# Patient Record
Sex: FEMALE | Race: WHITE | NOT HISPANIC OR LATINO | Employment: OTHER | URBAN - METROPOLITAN AREA
[De-identification: names, ages, dates, MRNs, and addresses within clinical notes are randomized per-mention and may not be internally consistent; named-entity substitution may affect disease eponyms.]

---

## 2017-04-10 ENCOUNTER — OFFICE VISIT (OUTPATIENT)
Dept: URGENT CARE | Facility: CLINIC | Age: 67
End: 2017-04-10
Payer: MEDICARE

## 2017-04-10 VITALS
RESPIRATION RATE: 20 BRPM | HEART RATE: 88 BPM | TEMPERATURE: 98.1 F | SYSTOLIC BLOOD PRESSURE: 128 MMHG | DIASTOLIC BLOOD PRESSURE: 80 MMHG | OXYGEN SATURATION: 97 %

## 2017-04-10 DIAGNOSIS — J06.9 UPPER RESPIRATORY INFECTION WITH COUGH AND CONGESTION: ICD-10-CM

## 2017-04-10 DIAGNOSIS — H65.01 RIGHT ACUTE SEROUS OTITIS MEDIA, RECURRENCE NOT SPECIFIED: ICD-10-CM

## 2017-04-10 PROCEDURE — G8419 CALC BMI OUT NRM PARAM NOF/U: HCPCS | Performed by: NURSE PRACTITIONER

## 2017-04-10 PROCEDURE — 1036F TOBACCO NON-USER: CPT | Performed by: NURSE PRACTITIONER

## 2017-04-10 PROCEDURE — 3014F SCREEN MAMMO DOC REV: CPT | Mod: 8P | Performed by: NURSE PRACTITIONER

## 2017-04-10 PROCEDURE — G8432 DEP SCR NOT DOC, RNG: HCPCS | Performed by: NURSE PRACTITIONER

## 2017-04-10 PROCEDURE — 1101F PT FALLS ASSESS-DOCD LE1/YR: CPT | Mod: 8P | Performed by: NURSE PRACTITIONER

## 2017-04-10 PROCEDURE — 4040F PNEUMOC VAC/ADMIN/RCVD: CPT | Mod: 8P | Performed by: NURSE PRACTITIONER

## 2017-04-10 PROCEDURE — 3017F COLORECTAL CA SCREEN DOC REV: CPT | Mod: 8P | Performed by: NURSE PRACTITIONER

## 2017-04-10 PROCEDURE — 99214 OFFICE O/P EST MOD 30 MIN: CPT | Performed by: NURSE PRACTITIONER

## 2017-04-10 RX ORDER — FLUTICASONE PROPIONATE 50 MCG
1 SPRAY, SUSPENSION (ML) NASAL 2 TIMES DAILY
Qty: 16 G | Refills: 0 | Status: SHIPPED | OUTPATIENT
Start: 2017-04-10 | End: 2023-10-13

## 2017-04-10 RX ORDER — AZITHROMYCIN 250 MG/1
TABLET, FILM COATED ORAL
Qty: 6 TAB | Refills: 0 | Status: SHIPPED | OUTPATIENT
Start: 2017-04-10 | End: 2023-10-13

## 2017-04-10 RX ORDER — CODEINE PHOSPHATE AND GUAIFENESIN 10; 100 MG/5ML; MG/5ML
5 SOLUTION ORAL
Qty: 120 ML | Refills: 0 | Status: SHIPPED | OUTPATIENT
Start: 2017-04-10 | End: 2023-10-13

## 2017-04-10 RX ORDER — TOPIRAMATE 50 MG/1
50 TABLET, FILM COATED ORAL 2 TIMES DAILY
COMMUNITY
End: 2023-10-13

## 2017-04-10 RX ORDER — BENZONATATE 100 MG/1
100 CAPSULE ORAL 3 TIMES DAILY PRN
Qty: 60 CAP | Refills: 0 | Status: SHIPPED | OUTPATIENT
Start: 2017-04-10 | End: 2023-10-13

## 2017-04-10 NOTE — PROGRESS NOTES
Chief Complaint   Patient presents with   • Cough     Almost a week wet cough and chest congestion        HISTORY OF PRESENT ILLNESS: Patient is a 67 y.o. female who presents today due to symptoms that started seven days ago. Pt reports a productive cough without blood in sputum. Reports associated mild sore throat, nasal congestion, right ear pain, fever, and body aches. Denies chest pain, shortness of breath, or wheezing. Admits to h/o asthma, CAP in 1980s. Has not needed her inhaler more often since onset of symptoms. No immunocompromise. Has tried OTC cold medications without significant relief of symptoms. No recent ABX use. Her  is here today is a patient with similar symptoms. No other aggravating or alleviating factors.     Patient Active Problem List    Diagnosis Date Noted   • Overweight 09/04/2015   • Hypertrophy of breast 08/28/2012   • Cervicalgia 08/28/2012   • Backache 08/28/2012       Allergies:Iodine; Penicillins; and Tape    Current Outpatient Prescriptions Ordered in [facility]   Medication Sig Dispense Refill   • topiramate (TOPAMAX) 50 MG tablet Take 50 mg by mouth 2 times a day.     • albuterol 108 (90 BASE) MCG/ACT Aero Soln inhalation aerosol Inhale 2 Puffs by mouth every 6 hours as needed for Shortness of Breath. 8.5 g 3   • Cholecalciferol (VITAMIN D3) 3000 UNITS Tab Take 10,000 Units by mouth every 7 days.     • fluoxetine (PROZAC) 20 MG Cap      • Testosterone 1.25 GM/ACT (1%) GEL Apply  to skin as directed.     • PROGESTERONE, VAGINAL, 4 % GEL Insert  in vagina. Applied to skin not vaginally     • lorazepam (ATIVAN) 0.5 MG TABS Take 0.5 mg by mouth every four hours as needed.     • Emollient (DHEA) 1 % CREA by Apply externally route.       No current [facility]-ordered facility-administered medications on file.       Past Medical History   Diagnosis Date   • Panic attacks    • Hiatus hernia syndrome    • Bronchitis    • ASTHMA      rare inhaler use   • Psychiatric problem      anxiety panic  "attacks   • Unspecified urinary incontinence      wears pads   • Anesthesia      \"Hard to get breath when first waking up\"   • Arrhythmia      can feel a \"flutter but has been checked out and is fine\"       Social History   Substance Use Topics   • Smoking status: Former Smoker -- 1.00 packs/day for 1.5 years     Quit date: 01/02/1970   • Smokeless tobacco: Never Used   • Alcohol Use: No       No family status information on file.     Family History   Problem Relation Age of Onset   • Hypertension Mother        ROS:  Review of Systems   Constitutional: Positive for subjective fever, chills, fatigue. Negative for weight loss and malaise.  HENT: Positive for congestion, right ear pain, and sore throat. Negative for nosebleeds, and neck pain.    Eyes: Negative for vision changes.   Cardiovascular: Negative for chest pain, palpitations, orthopnea and leg swelling.   Respiratory: Positive for cough and sputum production. Negative for shortness of breath and wheezing.   Gastrointestinal: Negative for abdominal pain, nausea, vomiting or diarrhea.   Skin: Negative for rash, diaphoresis.     Exam:  Blood pressure 128/80, pulse 88, temperature 36.7 °C (98.1 °F), resp. rate 20, SpO2 97 %.  General: well-nourished, well-developed female in NAD  Head: normocephalic, atraumatic  Eyes: PERRLA, EOM within normal limits, no conjunctival injection, no scleral icterus, visual fields and acuity grossly intact.  Ears: normal shape and symmetry, no tenderness, no discharge. External canals are without any significant edema or erythema. Right tympanic membrane is erythematous, injected, bulging. Left tympanic membrane is without any inflammation, no effusion. Gross auditory acuity is intact.  Nose: symmetrical without tenderness, mild discharge, erythema present bilateral nares.  Mouth/Throat: reasonable hygiene, no exudates or tonsillar enlargement. Erythema present.   Neck: no masses, range of motion within normal limits, no tracheal " deviation.  Lymph: mild cervical adenopathy. No supraclavicular adenopathy.   Neuro: alert and oriented. Cranial nerves 1-12 grossly intact.   Cardiovascular: regular rate and rhythm without murmurs, rubs, or gallops. No edema.   Pulmonary: no distress. Chest is symmetrical with respiration, no wheezes, crackles, or rhonchi.   Musculoskeletal: appropriate muscle tone, gait is stable.  Skin: warm, dry, intact, no clubbing, no cyanosis.   Psych: appropriate mood, affect, judgement.         Assessment/Plan:  1. Upper respiratory infection with cough and congestion  azithromycin (ZITHROMAX) 250 MG Tab    fluticasone (FLONASE) 50 MCG/ACT nasal spray    guaifenesin-codeine (ROBITUSSIN AC) Solution oral solution    benzonatate (TESSALON) 100 MG Cap   2. Right acute serous otitis media, recurrence not specified  fluticasone (FLONASE) 50 MCG/ACT nasal spray           Azithromycin and Flonase as directed. Tessalon Perles and Robitussin-AC for cough, sedative effects of medications discussed.  Rest, increase fluids, hand and respiratory hygiene. May take OTC medications as directed for symptom relief. Honey for cough.   Supportive care, differential diagnoses, and indications for immediate follow-up discussed with patient.   Pathogenesis of diagnosis discussed including typical length and natural progression.  Instructed to return to clinic or nearest emergency department for any change in condition, further concerns, or worsening of symptoms.  Patient states understanding of the plan of care and discharge instructions.  Instructed to make an appointment with their primary care provider in the next 3-7 days if not significantly improving and for further care.         Please note that this dictation was created using voice recognition software. I have made every reasonable attempt to correct obvious errors, but I expect that there are errors of grammar and possibly content that I did not discover before finalizing the  note.      ILSA Reagan.

## 2017-04-13 ENCOUNTER — APPOINTMENT (OUTPATIENT)
Dept: RADIOLOGY | Facility: IMAGING CENTER | Age: 67
End: 2017-04-13
Attending: PHYSICIAN ASSISTANT
Payer: MEDICARE

## 2017-04-13 ENCOUNTER — OFFICE VISIT (OUTPATIENT)
Dept: URGENT CARE | Facility: CLINIC | Age: 67
End: 2017-04-13
Payer: MEDICARE

## 2017-04-13 VITALS
WEIGHT: 160 LBS | BODY MASS INDEX: 25.71 KG/M2 | OXYGEN SATURATION: 95 % | RESPIRATION RATE: 17 BRPM | SYSTOLIC BLOOD PRESSURE: 102 MMHG | TEMPERATURE: 97.8 F | HEIGHT: 66 IN | DIASTOLIC BLOOD PRESSURE: 80 MMHG | HEART RATE: 82 BPM

## 2017-04-13 DIAGNOSIS — R05.9 COUGH: ICD-10-CM

## 2017-04-13 PROCEDURE — 4040F PNEUMOC VAC/ADMIN/RCVD: CPT | Mod: 8P | Performed by: PHYSICIAN ASSISTANT

## 2017-04-13 PROCEDURE — G8419 CALC BMI OUT NRM PARAM NOF/U: HCPCS | Performed by: PHYSICIAN ASSISTANT

## 2017-04-13 PROCEDURE — 71020 DX-CHEST-2 VIEWS: CPT | Mod: TC | Performed by: PHYSICIAN ASSISTANT

## 2017-04-13 PROCEDURE — 3017F COLORECTAL CA SCREEN DOC REV: CPT | Mod: 8P | Performed by: PHYSICIAN ASSISTANT

## 2017-04-13 PROCEDURE — 99214 OFFICE O/P EST MOD 30 MIN: CPT | Performed by: PHYSICIAN ASSISTANT

## 2017-04-13 PROCEDURE — 1101F PT FALLS ASSESS-DOCD LE1/YR: CPT | Mod: 8P | Performed by: PHYSICIAN ASSISTANT

## 2017-04-13 PROCEDURE — 3014F SCREEN MAMMO DOC REV: CPT | Mod: 8P | Performed by: PHYSICIAN ASSISTANT

## 2017-04-13 PROCEDURE — 1036F TOBACCO NON-USER: CPT | Performed by: PHYSICIAN ASSISTANT

## 2017-04-13 PROCEDURE — G8432 DEP SCR NOT DOC, RNG: HCPCS | Performed by: PHYSICIAN ASSISTANT

## 2017-04-13 RX ORDER — METHYLPREDNISOLONE 4 MG/1
TABLET ORAL
Qty: 21 TAB | Refills: 0 | Status: SHIPPED | OUTPATIENT
Start: 2017-04-13 | End: 2023-10-13

## 2017-04-13 NOTE — MR AVS SNAPSHOT
"        Niya Stone   2017 1:25 PM   Office Visit   MRN: [id number]    Department:  [facility]   Dept Phone:  [phone]    Description:  Female : 1950   Provider:  Anel Roach PA-C           Reason for Visit     URI seen previous, not feeling well, congestion, cough, not feeling well, hx of asthma, very tired, feeling weak, ears plugged      Allergies as of 2017     Allergen Noted Reactions    Iodine 2012   Swelling    Contrast      Penicillins 2012   Rash    Whole trunk area    Tape 2012   Rash    Steri strips - open sores  -  Paper tape & plastic tape OK      You were diagnosed with     Cough   [786.2.ICD-9-CM]         Vital Signs     Blood Pressure Pulse Temperature Respirations Height Weight    102/80 mmHg 82 36.6 °C (97.8 °F) 17 1.676 m (5' 5.98\") 72.576 kg (160 lb)    Body Mass Index Oxygen Saturation Smoking Status             25.84 kg/m2 95% Former Smoker         Basic Information     Date Of Birth Sex Race Ethnicity Preferred Language    1950 Female White Non- English      Problem List              ICD-10-CM Priority Class Noted - Resolved    Hypertrophy of breast N62   2012 - Present    Cervicalgia M54.2   2012 - Present    Backache M54.9   2012 - Present    Overweight E66.3   2015 - Present      Health Maintenance        Date Due Completion Dates    IMM DTaP/Tdap/Td Vaccine (1 - Tdap) 1968 ---    PAP SMEAR 1970 ---    COLONOSCOPY 1999 ---    MAMMOGRAM 10/10/2007 10/10/2006, 2005    IMM ZOSTER VACCINE 2009 ---    IMM PNEUMOCOCCAL 65+ (ADULT) LOW/MEDIUM RISK SERIES (1 of 2 - PCV13) 2014 ---    BONE DENSITY 2019            Current Immunizations     No immunizations on file.      Below and/or attached are the medications your provider expects you to take. Review all of your home medications and newly ordered medications with your provider and/or pharmacist. Follow medication " instructions as directed by your provider and/or pharmacist. Please keep your medication list with you and share with your provider. Update the information when medications are discontinued, doses are changed, or new medications (including over-the-counter products) are added; and carry medication information at all times in the event of emergency situations     Allergies:  IODINE - Swelling     PENICILLINS - Rash     TAPE - Rash               Medications  Valid as of: April 13, 2017 -  2:06 PM    Generic Name Brand Name Tablet Size Instructions for use    Albuterol Sulfate (Aero Soln) albuterol 108 (90 BASE) MCG/ACT Inhale 2 Puffs by mouth every 6 hours as needed for Shortness of Breath.        Azithromycin (Tab) ZITHROMAX 250 MG Take two tabs on day one followed by one tab on days 2-5.        Benzonatate (Cap) TESSALON 100 MG Take 1 Cap by mouth 3 times a day as needed for Cough.        Cholecalciferol (Tab) Vitamin D3 3000 UNITS Take 10,000 Units by mouth every 7 days.        Emollient (Cream) DHEA 1 % by Apply externally route.        FLUoxetine HCl (Cap) PROZAC 20 MG         Fluticasone Propionate (Suspension) FLONASE 50 MCG/ACT Spray 1 Spray in nose 2 times a day.        Guaifenesin-Codeine (Solution) ROBITUSSIN -10 mg/5mL Take 5 mL by mouth at bedtime as needed.        LORazepam (Tab) ATIVAN 0.5 MG Take 0.5 mg by mouth every four hours as needed.        Progesterone (Gel) PROGESTERONE (VAGINAL) 4 % Insert  in vagina. Applied to skin not vaginally        Testosterone (Gel) Testosterone 1.25 GM/ACT (1%) Apply  to skin as directed.        Topiramate (Tab) TOPAMAX 50 MG Take 50 mg by mouth 2 times a day.        .                 Medicines prescribed today were sent to:     [facility] [address] [place] [address], [place] - [address] [facility]    [address] [facility] [address]    Phone: [phone] Fax: [phone]    Open 24 Hours?: No      Medication refill  instructions:       If your prescription bottle indicates you have medication refills left, it is not necessary to call your provider’s office. Please contact your pharmacy and they will refill your medication.    If your prescription bottle indicates you do not have any refills left, you may request refills at any time through one of the following ways: The online Krillion system (except Urgent Care), by calling your provider’s office, or by asking your pharmacy to contact your provider’s office with a refill request. Medication refills are processed only during regular business hours and may not be available until the next business day. Your provider may request additional information or to have a follow-up visit with you prior to refilling your medication.   *Please Note: Medication refills are assigned a new Rx number when refilled electronically. Your pharmacy may indicate that no refills were authorized even though a new prescription for the same medication is available at the pharmacy. Please request the medicine by name with the pharmacy before contacting your provider for a refill.        Your To Do List     Future Labs/Procedures Complete By Expires    DX-CHEST-2 VIEWS  As directed 4/13/2018         Krillion Access Code: [email or web address]  Expires: 5/10/2017 11:20 AM    Krillion  A secure, online tool to manage your health information     [employer]’s Krillion® is a secure, online tool that connects you to your personalized health information from the privacy of your home -- day or night - making it very easy for you to manage your healthcare. Once the activation process is completed, you can even access your medical information using the Krillion cliff, which is available for free in the Apple Cliff store or Google Play store.     Krillion provides the following levels of access (as shown below):   My Chart Features   [facility] Doctor [facility]  Specialists [facility]  Urgent  Care Non-[facility]  Primary  Care  Doctor   Email your healthcare team securely and privately 24/7 X X X    Manage appointments: schedule your next appointment; view details of past/upcoming appointments X      Request prescription refills. X      View recent personal medical records, including lab and immunizations X X X X   View health record, including health history, allergies, medications X X X X   Read reports about your outpatient visits, procedures, consult and ER notes X X X X   See your discharge summary, which is a recap of your hospital and/or ER visit that includes your diagnosis, lab results, and care plan. X X       How to register for [employer]:  1. Go to  [email or web address].  2. Click on the Sign Up Now box, which takes you to the New Member Sign Up page. You will need to provide the following information:  a. Enter your [employer] Access Code exactly as it appears at the top of this page. (You will not need to use this code after you’ve completed the sign-up process. If you do not sign up before the expiration date, you must request a new code.)   b. Enter your date of birth.   c. Enter your home email address.   d. Click Submit, and follow the next screen’s instructions.  3. Create a [employer] ID. This will be your [employer] login ID and cannot be changed, so think of one that is secure and easy to remember.  4. Create a [employer] password. You can change your password at any time.  5. Enter your Password Reset Question and Answer. This can be used at a later time if you forget your password.   6. Enter your e-mail address. This allows you to receive e-mail notifications when new information is available in [employer].  7. Click Sign Up. You can now view your health information.    For assistance activating your [employer] account, call [phone]

## 2017-04-13 NOTE — PROGRESS NOTES
Chief Complaint   Patient presents with   • URI     seen previous, not feeling well, congestion, cough, not feeling well, hx of asthma, very tired, feeling weak, ears plugged       HISTORY OF PRESENT ILLNESS: Patient is a 67 y.o. female who presents today for 1-2 weeks of cough and congestion.   Patient was seen here 04/10/17 and given Z-pack and cough medicine.  She states she feels better overall but is still having cough and sinus pressure and feels exhausted.    She states she gets up in the morning and feels fine but by the middle of the day she is tired.  She has not felt wheezing or SOB in particular. No chest discomfort.  No leg swelling  She admits she still is having quite a bit of coughing and sleeping poorly overall.   Hx of asthma, generally excellently controlled.     Patient Active Problem List    Diagnosis Date Noted   • Overweight 09/04/2015   • Hypertrophy of breast 08/28/2012   • Cervicalgia 08/28/2012   • Backache 08/28/2012       Allergies:Iodine; Penicillins; and Tape    Current Outpatient Prescriptions Ordered in [facility]   Medication Sig Dispense Refill   • MethylPREDNISolone (MEDROL DOSEPAK) 4 MG Tablet Therapy Pack Take as directed 21 Tab 0   • topiramate (TOPAMAX) 50 MG tablet Take 50 mg by mouth 2 times a day.     • Cholecalciferol (VITAMIN D3) 3000 UNITS Tab Take 10,000 Units by mouth every 7 days.     • fluoxetine (PROZAC) 20 MG Cap      • Testosterone 1.25 GM/ACT (1%) GEL Apply  to skin as directed.     • PROGESTERONE, VAGINAL, 4 % GEL Insert  in vagina. Applied to skin not vaginally     • Emollient (DHEA) 1 % CREA by Apply externally route.     • lorazepam (ATIVAN) 0.5 MG TABS Take 0.5 mg by mouth every four hours as needed.     • azithromycin (ZITHROMAX) 250 MG Tab Take two tabs on day one followed by one tab on days 2-5. 6 Tab 0   • fluticasone (FLONASE) 50 MCG/ACT nasal spray Spray 1 Spray in nose 2 times a day. 16 g 0   • guaifenesin-codeine (ROBITUSSIN AC) Solution oral solution Take  "5 mL by mouth at bedtime as needed. 120 mL 0   • benzonatate (TESSALON) 100 MG Cap Take 1 Cap by mouth 3 times a day as needed for Cough. 60 Cap 0   • albuterol 108 (90 BASE) MCG/ACT Aero Soln inhalation aerosol Inhale 2 Puffs by mouth every 6 hours as needed for Shortness of Breath. 8.5 g 3     No current [facility]-ordered facility-administered medications on file.       Past Medical History   Diagnosis Date   • Panic attacks    • Hiatus hernia syndrome    • Bronchitis    • ASTHMA      rare inhaler use   • Psychiatric problem      anxiety panic attacks   • Unspecified urinary incontinence      wears pads   • Anesthesia      \"Hard to get breath when first waking up\"   • Arrhythmia      can feel a \"flutter but has been checked out and is fine\"       Social History   Substance Use Topics   • Smoking status: Former Smoker -- 1.00 packs/day for 1.5 years     Quit date: 01/02/1970   • Smokeless tobacco: Never Used   • Alcohol Use: No       No family status information on file.     Family History   Problem Relation Age of Onset   • Hypertension Mother        ROS:  Review of Systems   Constitutional: SEE HPI  HENT:  SEE HPI  Eyes: Negative for blurred vision.   Respiratory:  SEE HPI  Cardiovascular: Negative for chest pain, palpitations, orthopnea and leg swelling.   Gastrointestinal: Negative for heartburn, nausea, vomiting and abdominal pain.   All other systems reviewed and are negative.     Exam:  Blood pressure 102/80, pulse 82, temperature 36.6 °C (97.8 °F), resp. rate 17, height 1.676 m (5' 5.98\"), weight 72.576 kg (160 lb), SpO2 95 %.  General:  Well nourished, well developed female in NAD  Eyes: PERRLA, EOM within normal limits, no conjunctival injection, no scleral icterus, visual fields and acuity grossly intact.  Ears: Normal shape and symmetry, no tenderness, no discharge. External canals are without any significant edema or erythema. Tympanic membranes are without any inflammation, no effusion. Gross auditory " acuity is intact  Nose: Symmetrical, sinuses without tenderness, clear rhinorrhea.    Mouth: reasonable hygiene, no erythema exudates or tonsillar enlargement.  Neck: no masses, range of motion within normal limits, no tracheal deviation. No lymphadenopathy  Pulmonary: Normal respiratory effort, no wheezes, crackles, or rhonchi.  Cardiovascular: regular rate and rhythm without murmurs, rubs, or gallops.  Abdomen: Soft, nontender, nondistended. Normal bowel sounds. No hepatosplenomegaly or masses, or hernias. No rebound or guarding.  Skin: No visible rashes or lesion. Warm, pink, dry.   Extremities: no clubbing, cyanosis, or edema or leg swelling.   Neuro: A&O x 3. Speech normal/clear.  Normal gait.     RAD    Impression        No acute findings.         Reading Provider Reading Date     Leona Smith M.D. Apr 13, 2017       Assessment/Plan:  1. Cough  DX-CHEST-2 VIEWS    MethylPREDNISolone (MEDROL DOSEPAK) 4 MG Tablet Therapy Pack       -RAD negative as above.   Initial concern for pneumonia, patient reported hx.    -discussed working on adding anti-histamine and continuing Flonase, lung care.   -discussed steroids for sinus inflammation/cough in setting of hx of asthma, patient elects this trial.   -she has PCP appt in 2 weeks.  Please keep this for follow up  -RTC precautions in meantime if worsening at all    Supportive care, differential diagnoses, and indications for immediate follow-up discussed with patient.   Pathogenesis of diagnosis discussed including typical length and natural progression.   Instructed to return to clinic or nearest emergency department for any change in condition, further concerns, or worsening of symptoms.  Patient states understanding of the plan of care and discharge instructions.      Anel Roach PA-C

## 2017-06-22 ENCOUNTER — RX ONLY (OUTPATIENT)
Age: 67
Setting detail: RX ONLY
End: 2017-06-22

## 2017-08-14 ENCOUNTER — RX ONLY (OUTPATIENT)
Age: 67
Setting detail: RX ONLY
End: 2017-08-14

## 2021-01-13 ENCOUNTER — HOSPITAL ENCOUNTER (OUTPATIENT)
Facility: MEDICAL CENTER | Age: 71
End: 2021-01-13
Attending: UROLOGY
Payer: MEDICARE

## 2021-01-13 PROCEDURE — 87086 URINE CULTURE/COLONY COUNT: CPT

## 2021-01-16 LAB
BACTERIA UR CULT: NORMAL
SIGNIFICANT IND 70042: NORMAL
SITE SITE: NORMAL
SOURCE SOURCE: NORMAL

## 2023-10-12 ENCOUNTER — HOSPITAL ENCOUNTER (OUTPATIENT)
Dept: RADIOLOGY | Facility: MEDICAL CENTER | Age: 73
End: 2023-10-12
Attending: SURGERY
Payer: COMMERCIAL

## 2023-10-12 DIAGNOSIS — R19.04 LEFT LOWER QUADRANT ABDOMINAL MASS: ICD-10-CM

## 2023-10-12 PROCEDURE — 74176 CT ABD & PELVIS W/O CONTRAST: CPT

## 2023-10-13 ENCOUNTER — HOSPITAL ENCOUNTER (EMERGENCY)
Facility: MEDICAL CENTER | Age: 73
End: 2023-10-13
Attending: EMERGENCY MEDICINE
Payer: COMMERCIAL

## 2023-10-13 VITALS
OXYGEN SATURATION: 95 % | BODY MASS INDEX: 27.96 KG/M2 | HEART RATE: 69 BPM | TEMPERATURE: 98.4 F | SYSTOLIC BLOOD PRESSURE: 121 MMHG | HEIGHT: 64 IN | WEIGHT: 163.8 LBS | DIASTOLIC BLOOD PRESSURE: 57 MMHG | RESPIRATION RATE: 18 BRPM

## 2023-10-13 DIAGNOSIS — T85.79XA INFECTED PROSTHETIC MESH OF ABDOMINAL WALL, INITIAL ENCOUNTER (HCC): ICD-10-CM

## 2023-10-13 LAB
ALBUMIN SERPL BCP-MCNC: 4.1 G/DL (ref 3.2–4.9)
ALBUMIN/GLOB SERPL: 1 G/DL
ALP SERPL-CCNC: 130 U/L (ref 30–99)
ALT SERPL-CCNC: 14 U/L (ref 2–50)
ANION GAP SERPL CALC-SCNC: 12 MMOL/L (ref 7–16)
AST SERPL-CCNC: 15 U/L (ref 12–45)
BASOPHILS # BLD AUTO: 0.4 % (ref 0–1.8)
BASOPHILS # BLD: 0.03 K/UL (ref 0–0.12)
BILIRUB SERPL-MCNC: 0.4 MG/DL (ref 0.1–1.5)
BUN SERPL-MCNC: 8 MG/DL (ref 8–22)
CALCIUM ALBUM COR SERPL-MCNC: 9.1 MG/DL (ref 8.5–10.5)
CALCIUM SERPL-MCNC: 9.2 MG/DL (ref 8.4–10.2)
CHLORIDE SERPL-SCNC: 101 MMOL/L (ref 96–112)
CO2 SERPL-SCNC: 26 MMOL/L (ref 20–33)
CREAT SERPL-MCNC: 0.56 MG/DL (ref 0.5–1.4)
EOSINOPHIL # BLD AUTO: 0.11 K/UL (ref 0–0.51)
EOSINOPHIL NFR BLD: 1.4 % (ref 0–6.9)
ERYTHROCYTE [DISTWIDTH] IN BLOOD BY AUTOMATED COUNT: 43.7 FL (ref 35.9–50)
GFR SERPLBLD CREATININE-BSD FMLA CKD-EPI: 95 ML/MIN/1.73 M 2
GLOBULIN SER CALC-MCNC: 4.1 G/DL (ref 1.9–3.5)
GLUCOSE SERPL-MCNC: 94 MG/DL (ref 65–99)
HCT VFR BLD AUTO: 46.7 % (ref 37–47)
HGB BLD-MCNC: 15.1 G/DL (ref 12–16)
IMM GRANULOCYTES # BLD AUTO: 0.03 K/UL (ref 0–0.11)
IMM GRANULOCYTES NFR BLD AUTO: 0.4 % (ref 0–0.9)
LACTATE SERPL-SCNC: 0.8 MMOL/L (ref 0.5–2)
LYMPHOCYTES # BLD AUTO: 1.74 K/UL (ref 1–4.8)
LYMPHOCYTES NFR BLD: 22.8 % (ref 22–41)
MCH RBC QN AUTO: 31.4 PG (ref 27–33)
MCHC RBC AUTO-ENTMCNC: 32.3 G/DL (ref 32.2–35.5)
MCV RBC AUTO: 97.1 FL (ref 81.4–97.8)
MONOCYTES # BLD AUTO: 0.63 K/UL (ref 0–0.85)
MONOCYTES NFR BLD AUTO: 8.2 % (ref 0–13.4)
NEUTROPHILS # BLD AUTO: 5.1 K/UL (ref 1.82–7.42)
NEUTROPHILS NFR BLD: 66.8 % (ref 44–72)
NRBC # BLD AUTO: 0 K/UL
NRBC BLD-RTO: 0 /100 WBC (ref 0–0.2)
PLATELET # BLD AUTO: 347 K/UL (ref 164–446)
PMV BLD AUTO: 9.6 FL (ref 9–12.9)
POTASSIUM SERPL-SCNC: 4 MMOL/L (ref 3.6–5.5)
PROT SERPL-MCNC: 8.2 G/DL (ref 6–8.2)
RBC # BLD AUTO: 4.81 M/UL (ref 4.2–5.4)
SODIUM SERPL-SCNC: 139 MMOL/L (ref 135–145)
WBC # BLD AUTO: 7.6 K/UL (ref 4.8–10.8)

## 2023-10-13 PROCEDURE — 80053 COMPREHEN METABOLIC PANEL: CPT

## 2023-10-13 PROCEDURE — 99284 EMERGENCY DEPT VISIT MOD MDM: CPT

## 2023-10-13 PROCEDURE — 85025 COMPLETE CBC W/AUTO DIFF WBC: CPT

## 2023-10-13 PROCEDURE — 96374 THER/PROPH/DIAG INJ IV PUSH: CPT

## 2023-10-13 PROCEDURE — 700111 HCHG RX REV CODE 636 W/ 250 OVERRIDE (IP): Mod: JZ | Performed by: EMERGENCY MEDICINE

## 2023-10-13 PROCEDURE — 36415 COLL VENOUS BLD VENIPUNCTURE: CPT

## 2023-10-13 PROCEDURE — 83605 ASSAY OF LACTIC ACID: CPT

## 2023-10-13 PROCEDURE — 87040 BLOOD CULTURE FOR BACTERIA: CPT

## 2023-10-13 RX ORDER — LORAZEPAM 1 MG/1
1 TABLET ORAL EVERY 6 HOURS PRN
Qty: 3 TABLET | Refills: 0 | Status: SHIPPED | OUTPATIENT
Start: 2023-10-13 | End: 2023-10-13 | Stop reason: SDUPTHER

## 2023-10-13 RX ORDER — NAPROXEN SOD/DIPHENHYDRAMINE 220MG-25MG
1 TABLET ORAL
Status: SHIPPED | COMMUNITY
End: 2023-11-03

## 2023-10-13 RX ORDER — SULFAMETHOXAZOLE AND TRIMETHOPRIM 800; 160 MG/1; MG/1
1 TABLET ORAL 2 TIMES DAILY
Qty: 14 TABLET | Refills: 0 | Status: ACTIVE | OUTPATIENT
Start: 2023-10-13 | End: 2023-10-20

## 2023-10-13 RX ORDER — LORAZEPAM 2 MG/ML
0.5 INJECTION INTRAMUSCULAR ONCE
Status: COMPLETED | OUTPATIENT
Start: 2023-10-13 | End: 2023-10-13

## 2023-10-13 RX ORDER — LORAZEPAM 1 MG/1
1 TABLET ORAL EVERY 6 HOURS PRN
Qty: 3 TABLET | Refills: 0 | Status: SHIPPED | OUTPATIENT
Start: 2023-10-13 | End: 2023-10-16

## 2023-10-13 RX ORDER — DIAZEPAM 5 MG/1
5 TABLET ORAL EVERY 12 HOURS PRN
Status: SHIPPED | COMMUNITY
End: 2023-10-13

## 2023-10-13 RX ORDER — MONTELUKAST SODIUM 10 MG/1
10 TABLET ORAL
COMMUNITY

## 2023-10-13 RX ADMIN — LORAZEPAM 0.5 MG: 2 INJECTION INTRAMUSCULAR; INTRAVENOUS at 10:07

## 2023-10-13 ASSESSMENT — FIBROSIS 4 INDEX: FIB4 SCORE: 0.63

## 2023-10-13 NOTE — ED NOTES
"Poc reviewed with pt and spouse. Saline lock with blood draw after amb to br for void with collection of urine spec. Med for anxiety per request, aware of npo-states last po=\"sip of green tea\" pta  "

## 2023-10-13 NOTE — ED NOTES
Dc instructions and prescription reviewed with pt. Aware of need to  meds at [facility] in [place] and start today. Pt requesting ativan on d/c-update to erp. Pt also aware of need to f/u with surgery and pcp, to return for worsening s/s

## 2023-10-13 NOTE — ED NOTES
Pharmacy Medication Reconciliation      ~Medication reconciliation updated and complete per patient at bedside   ~Allergies have been verified and updated   ~No oral ABX within the last 30 days  ~Patient home pharmacy :  [facility]       ~Anticoagulants (rivaroxaban, apixaban, edoxaban, dabigatran, warfarin, enoxaparin) taken in the last 14 days? No

## 2023-10-13 NOTE — ED PROVIDER NOTES
"ER Provider Note    Scribed for Dr. Gordy Yuen M.D. by Roxi Loera. 10/13/2023  9:31 AM    Primary Care Provider: Dany Bautista M.D.    CHIEF COMPLAINT  Chief Complaint   Patient presents with    Abnormal Labs     Ct scan results with abscess       EXTERNAL RECORDS REVIEWED  Care everywhere: Ct scan shows mesh infection on the 12th ordered by Dr. Soto, surgery.    HPI/ROS  LIMITATION TO HISTORY   Select: : None    OUTSIDE HISTORIAN(S):      Niya Stone is a 73 y.o. female who presents to the ED for abdominal pain onset 2-3 weeks ago. She has a history of mesh placement by Dr. Soto, surgery 10 years ago. The patient states Dr. Soto ordered a Ct scan which showed possible mesh infection, prompting her to present to the ED today. She reports associated diaphoresis when she wakes up, but denies any fever or chills. No alleviating factors attempted. The patient states she has been having anxiety attacks and is having one currently. She adds \"they flare up when I have medical issues.\"    PAST MEDICAL HISTORY  Past Medical History:   Diagnosis Date    Anesthesia     \"Hard to get breath when first waking up\"    Arrhythmia     can feel a \"flutter but has been checked out and is fine\"    ASTHMA     rare inhaler use    Bronchitis     Hiatus hernia syndrome     Panic attacks     Psychiatric problem     anxiety panic attacks    Unspecified urinary incontinence     wears pads       SURGICAL HISTORY  Past Surgical History:   Procedure Laterality Date    GASTROSCOPY  9/4/2015    Procedure: GASTRIC BYPASS LAPAROSCOPY REVISION ENDOSCOPIC MINA EN Y, GASTRIC OULET REPAIR ;  Surgeon: Louie Wei M.D.;  Location: SURGERY [facility];  Service:     MAMMOPLASTY REDUCTION  8/28/2012    Performed by OMAYRA GOYAL at [facility]    LIPOSUCTION  8/28/2012    Performed by OMAYRA GOYAL at [facility]    HB COSMETIC ADJUSTMENT  2004    arm skin reduction, back skin " removal, s/p gastric bypass    GASTRIC BYPASS LAPAROSCOPIC      CHOLECYSTECTOMY      HYSTERECTOMY, TOTAL ABDOMINAL  1991    NM  DELIVERY ONLY      NERVE ULNAR REPAIR OR EXPLORE  1980    HERNIA REPAIR  [phone]    multiple       FAMILY HISTORY  Family History   Problem Relation Age of Onset    Hypertension Mother        SOCIAL HISTORY   reports that she quit smoking about 53 years ago. Her smoking use included cigarettes. She started smoking about 55 years ago. She has a 1.5 pack-year smoking history. She has never used smokeless tobacco. She reports that she does not drink alcohol and does not use drugs.    CURRENT MEDICATIONS  Previous Medications    ALBUTEROL 108 (90 BASE) MCG/ACT AERO SOLN INHALATION AEROSOL    Inhale 2 Puffs by mouth every 6 hours as needed for Shortness of Breath.    AZITHROMYCIN (ZITHROMAX) 250 MG TAB    Take two tabs on day one followed by one tab on days 2-5.    BENZONATATE (TESSALON) 100 MG CAP    Take 1 Cap by mouth 3 times a day as needed for Cough.    CHOLECALCIFEROL (VITAMIN D3) 3000 UNITS TAB    Take 10,000 Units by mouth every 7 days.    EMOLLIENT (DHEA) 1 % CREA    by Apply externally route.    FLUOXETINE (PROZAC) 20 MG CAP        FLUTICASONE (FLONASE) 50 MCG/ACT NASAL SPRAY    Spray 1 Spray in nose 2 times a day.    GUAIFENESIN-CODEINE (ROBITUSSIN AC) SOLUTION ORAL SOLUTION    Take 5 mL by mouth at bedtime as needed.    LORAZEPAM (ATIVAN) 0.5 MG TABS    Take 0.5 mg by mouth every four hours as needed.    METHYLPREDNISOLONE (MEDROL DOSEPAK) 4 MG TABLET THERAPY PACK    Take as directed    PROGESTERONE, VAGINAL, 4 % GEL    Insert  in vagina. Applied to skin not vaginally    TESTOSTERONE 1.25 GM/ACT (1%) GEL    Apply  to skin as directed.    TOPIRAMATE (TOPAMAX) 50 MG TABLET    Take 50 mg by mouth 2 times a day.       ALLERGIES  Iodine, Penicillins, and Tape    PHYSICAL EXAM  BP (!) 141/98   Pulse 77   Temp 36.6 °C (97.8 °F) (Temporal)   Resp 20   Ht 1.626 m (5'  "4\")   Wt 74.3 kg (163 lb 12.8 oz)   SpO2 98%   BMI 28.12 kg/m²   Constitutional: Alert in no apparent distress.  HENT: No signs of trauma, Bilateral external ears normal, Nose normal.   Eyes: Pupils are equal and reactive, Conjunctiva normal, Non-icteric.   Neck: Normal range of motion, No tenderness, Supple, No stridor.   Lymphatic: No lymphadenopathy noted.   Cardiovascular: Regular rate and rhythm, no murmurs.   Thorax & Lungs: Normal breath sounds, No respiratory distress, No wheezing, No chest tenderness.   Abdomen: Bowel sounds normal, Soft, left lower quadrant tenderness to palpation, Left midline surgical scar, No masses, No pulsatile masses. No peritoneal signs.  Skin: Warm, Dry, No erythema, No rash.   Back: No bony tenderness, No CVA tenderness.   Extremities: Intact distal pulses, No edema, No tenderness, No cyanosis.  Musculoskeletal: Good range of motion in all major joints. No tenderness to palpation or major deformities noted.   Neurologic: Alert , Normal motor function, Normal sensory function, No focal deficits noted.   Psychiatric: Affect normal, Judgment normal, Mood normal.     DIAGNOSTIC STUDIES & PROCEDURES    Labs:   Labs Reviewed   COMP METABOLIC PANEL - Abnormal; Notable for the following components:       Result Value    Alkaline Phosphatase 130 (*)     Globulin 4.1 (*)     All other components within normal limits   CBC WITH DIFFERENTIAL   LACTIC ACID   ESTIMATED GFR   BLOOD CULTURE    Narrative:     Per Hospital Policy: Only change Specimen Src: to \"Line\" if  specified by physician order.  Release to patient->Immediate   BLOOD CULTURE     All labs reviewed by me.      COURSE & MEDICAL DECISION MAKING    ED Observation Status? Yes; I am placing the patient in to an observation status due to a diagnostic uncertainty as well as therapeutic intensity. Patient placed in observation status at 9:34 AM, 10/13/2023.     Observation plan is as follows: The patient will be observed pending labs, " Ativan and consult with surgery.    Upon Reevaluation, the patient's condition has: Improved; and will be discharged.    Patient discharged from ED Observation status at 12:11 PM (Time) 10/13/2023 (Date).     INITIAL ASSESSMENT AND PLAN  Care Narrative:       9:31 AM - Patient seen and evaluated at bedside for abdominal pain. Patient had previous Ct showing possible mesh infection. Informed her of the plan for consult with on-call surgeon. The patient is requesting anxiet medication and states she is having a panic attack. Patient agrees to plan of care. Patient will be treated with Ativan 0.5 mg for her symptoms. Ordered CBC diff, CMP, Blood Culture and Lactic Acid to evaluate.    10:34 AM - Paged surgery.    11:09 AM - Re-Paged surgery.    12:06 PM I discussed the patient's case and the above findings with Dr. Ferrari (surgery) who will consult.    ADDITIONAL PROBLEM LIST AND DISPOSITION  Patient with abdominal pain.  Ultimately she has what appears to be an infection around her mesh.  I spoke to surgery who feels that this is chronic.  I will give her Augmentin.  She has no leukocytosis.  She has no electrolyte derangement.  She is not septic at this time.  We will discharge her home with strict return precautions and follow-up.               DISPOSITION AND DISCUSSIONS  I have discussed management of the patient with the following physicians and HARRIET's: Dr. Ferrari (surgery)    Discussion of management with other [facility] or appropriate source(s): None     Escalation of care considered, and ultimately not performed: acute inpatient care management, however at this time, the patient is most appropriate for outpatient management.    Barriers to care at this time, including but not limited to:  None noted .     Decision tools and prescription drugs considered including, but not limited to: Medication modification I do not see any indication for medication modification.    The patient will return for new or worsening  symptoms and is stable at the time of discharge.    DISPOSITION:  Patient will be discharged home in stable condition.    FOLLOW UP:  Nic Ferrari M.D.  [address] [facility]  [place] [id number] [phone]    In 2 days      FINAL IMPRESSION   1. Infected prosthetic mesh of abdominal wall, initial encounter ([facility])         Roxi MENDOZA (Scribe), am scribing for, and in the presence of, Gordy Yuen M.D..    Electronically signed by: Roxi Loera (Scribe), 10/13/2023    Gordy MENDOZA M.D. personally performed the services described in this documentation, as scribed by Roxi Loera in my presence, and it is both accurate and complete.    The note accurately reflects work and decisions made by me.  Gordy Yuen M.D.  10/13/2023  1:45 PM

## 2023-10-13 NOTE — ED NOTES
Patient walks in ambulatory sent from their primary. Patient says that they have an infection and need to be sen. Patient s not febrile and no jaundice. Patient also complaining of abdominal pain in all quadrants.

## 2023-10-13 NOTE — ED NOTES
"Pt abd to er with c/o abd pain-states mesh placement \"atleast 10 years ago\" and anxiety r/t same. Ct ordered previously, + abscess. Erp to bedside  "

## 2023-10-18 LAB
BACTERIA BLD CULT: NORMAL
SIGNIFICANT IND 70042: NORMAL
SITE SITE: NORMAL
SOURCE SOURCE: NORMAL

## 2023-11-02 ENCOUNTER — APPOINTMENT (OUTPATIENT)
Dept: ADMISSIONS | Facility: MEDICAL CENTER | Age: 73
DRG: 907 | End: 2023-11-02
Attending: COLON & RECTAL SURGERY
Payer: COMMERCIAL

## 2023-11-03 ENCOUNTER — PRE-ADMISSION TESTING (OUTPATIENT)
Dept: ADMISSIONS | Facility: MEDICAL CENTER | Age: 73
DRG: 907 | End: 2023-11-03
Attending: COLON & RECTAL SURGERY
Payer: COMMERCIAL

## 2023-11-03 RX ORDER — HYDROXYZINE HYDROCHLORIDE 10 MG/1
10 TABLET, FILM COATED ORAL EVERY 6 HOURS PRN
COMMUNITY
Start: 2023-10-14

## 2023-11-03 RX ORDER — FLUOXETINE HYDROCHLORIDE 40 MG/1
40 CAPSULE ORAL DAILY
COMMUNITY
Start: 2023-10-13

## 2023-11-08 ENCOUNTER — HOSPITAL ENCOUNTER (INPATIENT)
Facility: MEDICAL CENTER | Age: 73
LOS: 5 days | DRG: 907 | End: 2023-11-13
Attending: COLON & RECTAL SURGERY | Admitting: COLON & RECTAL SURGERY
Payer: COMMERCIAL

## 2023-11-08 ENCOUNTER — ANESTHESIA EVENT (OUTPATIENT)
Dept: SURGERY | Facility: MEDICAL CENTER | Age: 73
DRG: 907 | End: 2023-11-08
Payer: COMMERCIAL

## 2023-11-08 ENCOUNTER — ANESTHESIA (OUTPATIENT)
Dept: SURGERY | Facility: MEDICAL CENTER | Age: 73
DRG: 907 | End: 2023-11-08
Payer: COMMERCIAL

## 2023-11-08 DIAGNOSIS — B99.9 INFECTION: ICD-10-CM

## 2023-11-08 LAB
GRAM STN SPEC: NORMAL
SIGNIFICANT IND 70042: NORMAL
SITE SITE: NORMAL
SOURCE SOURCE: NORMAL

## 2023-11-08 PROCEDURE — 160048 HCHG OR STATISTICAL LEVEL 1-5: Performed by: COLON & RECTAL SURGERY

## 2023-11-08 PROCEDURE — 700111 HCHG RX REV CODE 636 W/ 250 OVERRIDE (IP): Performed by: ANESTHESIOLOGY

## 2023-11-08 PROCEDURE — 0W9G00Z DRAINAGE OF PERITONEAL CAVITY WITH DRAINAGE DEVICE, OPEN APPROACH: ICD-10-PCS | Performed by: COLON & RECTAL SURGERY

## 2023-11-08 PROCEDURE — 700105 HCHG RX REV CODE 258: Performed by: NURSE PRACTITIONER

## 2023-11-08 PROCEDURE — A9270 NON-COVERED ITEM OR SERVICE: HCPCS | Performed by: ANESTHESIOLOGY

## 2023-11-08 PROCEDURE — 160035 HCHG PACU - 1ST 60 MINS PHASE I: Performed by: COLON & RECTAL SURGERY

## 2023-11-08 PROCEDURE — 87205 SMEAR GRAM STAIN: CPT

## 2023-11-08 PROCEDURE — 87102 FUNGUS ISOLATION CULTURE: CPT

## 2023-11-08 PROCEDURE — 0DNW0ZZ RELEASE PERITONEUM, OPEN APPROACH: ICD-10-PCS | Performed by: COLON & RECTAL SURGERY

## 2023-11-08 PROCEDURE — 700111 HCHG RX REV CODE 636 W/ 250 OVERRIDE (IP): Performed by: NURSE PRACTITIONER

## 2023-11-08 PROCEDURE — A9270 NON-COVERED ITEM OR SERVICE: HCPCS | Performed by: NURSE PRACTITIONER

## 2023-11-08 PROCEDURE — 160031 HCHG SURGERY MINUTES - 1ST 30 MINS LEVEL 5: Performed by: COLON & RECTAL SURGERY

## 2023-11-08 PROCEDURE — 700102 HCHG RX REV CODE 250 W/ 637 OVERRIDE(OP): Performed by: NURSE PRACTITIONER

## 2023-11-08 PROCEDURE — 87076 CULTURE ANAEROBE IDENT EACH: CPT | Mod: 91

## 2023-11-08 PROCEDURE — 160002 HCHG RECOVERY MINUTES (STAT): Performed by: COLON & RECTAL SURGERY

## 2023-11-08 PROCEDURE — 87075 CULTR BACTERIA EXCEPT BLOOD: CPT | Mod: 91

## 2023-11-08 PROCEDURE — 87070 CULTURE OTHR SPECIMN AEROBIC: CPT

## 2023-11-08 PROCEDURE — 0DT80ZZ RESECTION OF SMALL INTESTINE, OPEN APPROACH: ICD-10-PCS | Performed by: COLON & RECTAL SURGERY

## 2023-11-08 PROCEDURE — 700102 HCHG RX REV CODE 250 W/ 637 OVERRIDE(OP): Performed by: ANESTHESIOLOGY

## 2023-11-08 PROCEDURE — 87077 CULTURE AEROBIC IDENTIFY: CPT | Mod: 91

## 2023-11-08 PROCEDURE — 3E0T3BZ INTRODUCTION OF ANESTHETIC AGENT INTO PERIPHERAL NERVES AND PLEXI, PERCUTANEOUS APPROACH: ICD-10-PCS | Performed by: COLON & RECTAL SURGERY

## 2023-11-08 PROCEDURE — 110371 HCHG SHELL REV 272: Performed by: COLON & RECTAL SURGERY

## 2023-11-08 PROCEDURE — 160009 HCHG ANES TIME/MIN: Performed by: COLON & RECTAL SURGERY

## 2023-11-08 PROCEDURE — 64488 TAP BLOCK BI INJECTION: CPT | Performed by: COLON & RECTAL SURGERY

## 2023-11-08 PROCEDURE — 770001 HCHG ROOM/CARE - MED/SURG/GYN PRIV*

## 2023-11-08 PROCEDURE — 700101 HCHG RX REV CODE 250: Performed by: ANESTHESIOLOGY

## 2023-11-08 PROCEDURE — 160042 HCHG SURGERY MINUTES - EA ADDL 1 MIN LEVEL 5: Performed by: COLON & RECTAL SURGERY

## 2023-11-08 PROCEDURE — 88307 TISSUE EXAM BY PATHOLOGIST: CPT | Mod: 59

## 2023-11-08 PROCEDURE — 0WPF0JZ REMOVAL OF SYNTHETIC SUBSTITUTE FROM ABDOMINAL WALL, OPEN APPROACH: ICD-10-PCS | Performed by: COLON & RECTAL SURGERY

## 2023-11-08 PROCEDURE — 700105 HCHG RX REV CODE 258: Performed by: ANESTHESIOLOGY

## 2023-11-08 PROCEDURE — C1729 CATH, DRAINAGE: HCPCS | Performed by: COLON & RECTAL SURGERY

## 2023-11-08 PROCEDURE — 160036 HCHG PACU - EA ADDL 30 MINS PHASE I: Performed by: COLON & RECTAL SURGERY

## 2023-11-08 RX ORDER — HYDRALAZINE HYDROCHLORIDE 20 MG/ML
5 INJECTION INTRAMUSCULAR; INTRAVENOUS
Status: DISCONTINUED | OUTPATIENT
Start: 2023-11-08 | End: 2023-11-08 | Stop reason: HOSPADM

## 2023-11-08 RX ORDER — ACETAMINOPHEN 10 MG/ML
1000 INJECTION, SOLUTION INTRAVENOUS EVERY 6 HOURS
Status: COMPLETED | OUTPATIENT
Start: 2023-11-09 | End: 2023-11-09

## 2023-11-08 RX ORDER — HYDROXYZINE HYDROCHLORIDE 10 MG/1
10 TABLET, FILM COATED ORAL EVERY 6 HOURS PRN
Status: DISCONTINUED | OUTPATIENT
Start: 2023-11-08 | End: 2023-11-13 | Stop reason: HOSPADM

## 2023-11-08 RX ORDER — ENOXAPARIN SODIUM 100 MG/ML
40 INJECTION SUBCUTANEOUS DAILY
Status: DISCONTINUED | OUTPATIENT
Start: 2023-11-09 | End: 2023-11-13 | Stop reason: HOSPADM

## 2023-11-08 RX ORDER — CIPROFLOXACIN 500 MG/1
500 TABLET, FILM COATED ORAL 2 TIMES DAILY
COMMUNITY
Start: 2023-11-04 | End: 2023-12-14

## 2023-11-08 RX ORDER — ACETAMINOPHEN 500 MG
1000 TABLET ORAL EVERY 6 HOURS PRN
Status: DISCONTINUED | OUTPATIENT
Start: 2023-11-13 | End: 2023-11-13 | Stop reason: HOSPADM

## 2023-11-08 RX ORDER — BUPIVACAINE HYDROCHLORIDE 2.5 MG/ML
INJECTION, SOLUTION EPIDURAL; INFILTRATION; INTRACAUDAL
Status: COMPLETED | OUTPATIENT
Start: 2023-11-08 | End: 2023-11-08

## 2023-11-08 RX ORDER — SODIUM CHLORIDE, SODIUM LACTATE, POTASSIUM CHLORIDE, CALCIUM CHLORIDE 600; 310; 30; 20 MG/100ML; MG/100ML; MG/100ML; MG/100ML
INJECTION, SOLUTION INTRAVENOUS CONTINUOUS
Status: DISCONTINUED | OUTPATIENT
Start: 2023-11-08 | End: 2023-11-08 | Stop reason: HOSPADM

## 2023-11-08 RX ORDER — DEXAMETHASONE SODIUM PHOSPHATE 4 MG/ML
INJECTION, SOLUTION INTRA-ARTICULAR; INTRALESIONAL; INTRAMUSCULAR; INTRAVENOUS; SOFT TISSUE PRN
Status: DISCONTINUED | OUTPATIENT
Start: 2023-11-08 | End: 2023-11-08 | Stop reason: SURG

## 2023-11-08 RX ORDER — METRONIDAZOLE 500 MG/100ML
500 INJECTION, SOLUTION INTRAVENOUS EVERY 12 HOURS
Status: DISCONTINUED | OUTPATIENT
Start: 2023-11-08 | End: 2023-11-09

## 2023-11-08 RX ORDER — ACETAMINOPHEN 500 MG
1000 TABLET ORAL EVERY 6 HOURS
Status: DISPENSED | OUTPATIENT
Start: 2023-11-09 | End: 2023-11-13

## 2023-11-08 RX ORDER — HYDROMORPHONE HYDROCHLORIDE 2 MG/ML
INJECTION, SOLUTION INTRAMUSCULAR; INTRAVENOUS; SUBCUTANEOUS PRN
Status: DISCONTINUED | OUTPATIENT
Start: 2023-11-08 | End: 2023-11-08 | Stop reason: SURG

## 2023-11-08 RX ORDER — PROMETHAZINE HYDROCHLORIDE 25 MG/1
25 SUPPOSITORY RECTAL EVERY 4 HOURS PRN
Status: DISCONTINUED | OUTPATIENT
Start: 2023-11-08 | End: 2023-11-13 | Stop reason: HOSPADM

## 2023-11-08 RX ORDER — OXYCODONE HCL 5 MG/5 ML
5 SOLUTION, ORAL ORAL
Status: COMPLETED | OUTPATIENT
Start: 2023-11-08 | End: 2023-11-08

## 2023-11-08 RX ORDER — DIPHENHYDRAMINE HYDROCHLORIDE 50 MG/ML
12.5 INJECTION INTRAMUSCULAR; INTRAVENOUS EVERY 6 HOURS PRN
Status: DISCONTINUED | OUTPATIENT
Start: 2023-11-08 | End: 2023-11-13 | Stop reason: HOSPADM

## 2023-11-08 RX ORDER — HYDROMORPHONE HYDROCHLORIDE 1 MG/ML
0.2 INJECTION, SOLUTION INTRAMUSCULAR; INTRAVENOUS; SUBCUTANEOUS
Status: DISCONTINUED | OUTPATIENT
Start: 2023-11-08 | End: 2023-11-08 | Stop reason: HOSPADM

## 2023-11-08 RX ORDER — DIPHENHYDRAMINE HYDROCHLORIDE 50 MG/ML
12.5 INJECTION INTRAMUSCULAR; INTRAVENOUS
Status: DISCONTINUED | OUTPATIENT
Start: 2023-11-08 | End: 2023-11-08 | Stop reason: HOSPADM

## 2023-11-08 RX ORDER — SIMETHICONE 125 MG
125 TABLET,CHEWABLE ORAL 3 TIMES DAILY PRN
Status: DISCONTINUED | OUTPATIENT
Start: 2023-11-08 | End: 2023-11-13 | Stop reason: HOSPADM

## 2023-11-08 RX ORDER — OXYCODONE HCL 5 MG/5 ML
10 SOLUTION, ORAL ORAL
Status: COMPLETED | OUTPATIENT
Start: 2023-11-08 | End: 2023-11-08

## 2023-11-08 RX ORDER — HYDROMORPHONE HYDROCHLORIDE 1 MG/ML
0.6 INJECTION, SOLUTION INTRAMUSCULAR; INTRAVENOUS; SUBCUTANEOUS
Status: DISCONTINUED | OUTPATIENT
Start: 2023-11-08 | End: 2023-11-08 | Stop reason: HOSPADM

## 2023-11-08 RX ORDER — ONDANSETRON 2 MG/ML
INJECTION INTRAMUSCULAR; INTRAVENOUS PRN
Status: DISCONTINUED | OUTPATIENT
Start: 2023-11-08 | End: 2023-11-08 | Stop reason: SURG

## 2023-11-08 RX ORDER — ROCURONIUM BROMIDE 10 MG/ML
INJECTION, SOLUTION INTRAVENOUS PRN
Status: DISCONTINUED | OUTPATIENT
Start: 2023-11-08 | End: 2023-11-08 | Stop reason: SURG

## 2023-11-08 RX ORDER — GABAPENTIN 300 MG/1
300 CAPSULE ORAL 3 TIMES DAILY
Status: DISCONTINUED | OUTPATIENT
Start: 2023-11-09 | End: 2023-11-13 | Stop reason: HOSPADM

## 2023-11-08 RX ORDER — SODIUM CHLORIDE, SODIUM LACTATE, POTASSIUM CHLORIDE, CALCIUM CHLORIDE 600; 310; 30; 20 MG/100ML; MG/100ML; MG/100ML; MG/100ML
INJECTION, SOLUTION INTRAVENOUS
Status: DISCONTINUED | OUTPATIENT
Start: 2023-11-08 | End: 2023-11-08 | Stop reason: SURG

## 2023-11-08 RX ORDER — SODIUM CHLORIDE, SODIUM LACTATE, POTASSIUM CHLORIDE, AND CALCIUM CHLORIDE .6; .31; .03; .02 G/100ML; G/100ML; G/100ML; G/100ML
500 INJECTION, SOLUTION INTRAVENOUS
Status: COMPLETED | OUTPATIENT
Start: 2023-11-08 | End: 2023-11-12

## 2023-11-08 RX ORDER — HALOPERIDOL 5 MG/ML
1 INJECTION INTRAMUSCULAR
Status: DISCONTINUED | OUTPATIENT
Start: 2023-11-08 | End: 2023-11-08 | Stop reason: HOSPADM

## 2023-11-08 RX ORDER — ALBUTEROL SULFATE 90 UG/1
2 AEROSOL, METERED RESPIRATORY (INHALATION)
Status: DISCONTINUED | OUTPATIENT
Start: 2023-11-08 | End: 2023-11-13 | Stop reason: HOSPADM

## 2023-11-08 RX ORDER — ONDANSETRON 2 MG/ML
4 INJECTION INTRAMUSCULAR; INTRAVENOUS
Status: COMPLETED | OUTPATIENT
Start: 2023-11-08 | End: 2023-11-08

## 2023-11-08 RX ORDER — HYDROMORPHONE HYDROCHLORIDE 1 MG/ML
0.4 INJECTION, SOLUTION INTRAMUSCULAR; INTRAVENOUS; SUBCUTANEOUS
Status: DISCONTINUED | OUTPATIENT
Start: 2023-11-08 | End: 2023-11-08 | Stop reason: HOSPADM

## 2023-11-08 RX ORDER — MEPERIDINE HYDROCHLORIDE 25 MG/ML
12.5 INJECTION INTRAMUSCULAR; INTRAVENOUS; SUBCUTANEOUS
Status: DISCONTINUED | OUTPATIENT
Start: 2023-11-08 | End: 2023-11-08 | Stop reason: HOSPADM

## 2023-11-08 RX ORDER — ONDANSETRON 2 MG/ML
4 INJECTION INTRAMUSCULAR; INTRAVENOUS EVERY 4 HOURS PRN
Status: DISCONTINUED | OUTPATIENT
Start: 2023-11-08 | End: 2023-11-13 | Stop reason: HOSPADM

## 2023-11-08 RX ORDER — EPHEDRINE SULFATE 50 MG/ML
INJECTION, SOLUTION INTRAVENOUS PRN
Status: DISCONTINUED | OUTPATIENT
Start: 2023-11-08 | End: 2023-11-08 | Stop reason: SURG

## 2023-11-08 RX ORDER — MIDAZOLAM HYDROCHLORIDE 1 MG/ML
INJECTION INTRAMUSCULAR; INTRAVENOUS PRN
Status: DISCONTINUED | OUTPATIENT
Start: 2023-11-08 | End: 2023-11-08 | Stop reason: SURG

## 2023-11-08 RX ORDER — FLUOXETINE 20 MG/5ML
40 SOLUTION ORAL DAILY
Status: DISCONTINUED | OUTPATIENT
Start: 2023-11-09 | End: 2023-11-13 | Stop reason: HOSPADM

## 2023-11-08 RX ORDER — ENALAPRILAT 1.25 MG/ML
2.5 INJECTION INTRAVENOUS EVERY 6 HOURS PRN
Status: DISCONTINUED | OUTPATIENT
Start: 2023-11-08 | End: 2023-11-13 | Stop reason: HOSPADM

## 2023-11-08 RX ORDER — CEFAZOLIN SODIUM 1 G/3ML
INJECTION, POWDER, FOR SOLUTION INTRAMUSCULAR; INTRAVENOUS PRN
Status: DISCONTINUED | OUTPATIENT
Start: 2023-11-08 | End: 2023-11-08 | Stop reason: SURG

## 2023-11-08 RX ORDER — HYDRALAZINE HYDROCHLORIDE 20 MG/ML
20 INJECTION INTRAMUSCULAR; INTRAVENOUS EVERY 6 HOURS PRN
Status: DISCONTINUED | OUTPATIENT
Start: 2023-11-08 | End: 2023-11-13 | Stop reason: HOSPADM

## 2023-11-08 RX ADMIN — ROCURONIUM BROMIDE 20 MG: 50 INJECTION, SOLUTION INTRAVENOUS at 16:22

## 2023-11-08 RX ADMIN — POTASSIUM CHLORIDE: 2 INJECTION, SOLUTION, CONCENTRATE INTRAVENOUS at 23:20

## 2023-11-08 RX ADMIN — ROCURONIUM BROMIDE 50 MG: 50 INJECTION, SOLUTION INTRAVENOUS at 15:44

## 2023-11-08 RX ADMIN — FAMOTIDINE 20 MG: 10 INJECTION, SOLUTION INTRAVENOUS at 22:58

## 2023-11-08 RX ADMIN — DEXAMETHASONE SODIUM PHOSPHATE 4 MG: 4 INJECTION INTRA-ARTICULAR; INTRALESIONAL; INTRAMUSCULAR; INTRAVENOUS; SOFT TISSUE at 16:59

## 2023-11-08 RX ADMIN — FENTANYL CITRATE 50 MCG: 50 INJECTION, SOLUTION INTRAMUSCULAR; INTRAVENOUS at 17:25

## 2023-11-08 RX ADMIN — METRONIDAZOLE 500 MG: 500 INJECTION, SOLUTION INTRAVENOUS at 20:12

## 2023-11-08 RX ADMIN — ONDANSETRON 4 MG: 2 INJECTION INTRAMUSCULAR; INTRAVENOUS at 16:57

## 2023-11-08 RX ADMIN — EPHEDRINE SULFATE 10 MG: 50 INJECTION INTRAVENOUS at 16:16

## 2023-11-08 RX ADMIN — CEFAZOLIN 2 G: 1 INJECTION, POWDER, FOR SOLUTION INTRAMUSCULAR; INTRAVENOUS at 15:45

## 2023-11-08 RX ADMIN — MIDAZOLAM 2 MG: 1 INJECTION, SOLUTION INTRAMUSCULAR; INTRAVENOUS at 15:40

## 2023-11-08 RX ADMIN — HYDROMORPHONE HYDROCHLORIDE 0.4 MG: 1 INJECTION, SOLUTION INTRAMUSCULAR; INTRAVENOUS; SUBCUTANEOUS at 19:09

## 2023-11-08 RX ADMIN — BUPIVACAINE HYDROCHLORIDE 30 ML: 2.5 INJECTION, SOLUTION EPIDURAL; INFILTRATION; INTRACAUDAL at 16:48

## 2023-11-08 RX ADMIN — HYDROMORPHONE HYDROCHLORIDE 0.2 MG: 1 INJECTION, SOLUTION INTRAMUSCULAR; INTRAVENOUS; SUBCUTANEOUS at 19:23

## 2023-11-08 RX ADMIN — FENTANYL CITRATE 50 MCG: 50 INJECTION, SOLUTION INTRAMUSCULAR; INTRAVENOUS at 17:18

## 2023-11-08 RX ADMIN — OXYCODONE HYDROCHLORIDE 5 MG: 5 SOLUTION ORAL at 19:23

## 2023-11-08 RX ADMIN — FENTANYL CITRATE 100 MCG: 50 INJECTION, SOLUTION INTRAMUSCULAR; INTRAVENOUS at 15:42

## 2023-11-08 RX ADMIN — HYDROMORPHONE HYDROCHLORIDE 0.5 MG: 2 INJECTION INTRAMUSCULAR; INTRAVENOUS; SUBCUTANEOUS at 17:06

## 2023-11-08 RX ADMIN — HYDROMORPHONE HYDROCHLORIDE 0.4 MG: 1 INJECTION, SOLUTION INTRAMUSCULAR; INTRAVENOUS; SUBCUTANEOUS at 17:39

## 2023-11-08 RX ADMIN — Medication: at 22:57

## 2023-11-08 RX ADMIN — PROPOFOL 150 MG: 10 INJECTION, EMULSION INTRAVENOUS at 15:43

## 2023-11-08 RX ADMIN — SODIUM CHLORIDE, POTASSIUM CHLORIDE, SODIUM LACTATE AND CALCIUM CHLORIDE: 600; 310; 30; 20 INJECTION, SOLUTION INTRAVENOUS at 15:40

## 2023-11-08 RX ADMIN — HYDROXYZINE HYDROCHLORIDE 10 MG: 10 TABLET ORAL at 23:09

## 2023-11-08 RX ADMIN — HYDROMORPHONE HYDROCHLORIDE 0.5 MG: 2 INJECTION INTRAMUSCULAR; INTRAVENOUS; SUBCUTANEOUS at 16:54

## 2023-11-08 ASSESSMENT — PAIN DESCRIPTION - PAIN TYPE
TYPE: ACUTE PAIN
TYPE: ACUTE PAIN;SURGICAL PAIN
TYPE: ACUTE PAIN
TYPE: SURGICAL PAIN;ACUTE PAIN;CHRONIC PAIN
TYPE: ACUTE PAIN;SURGICAL PAIN
TYPE: ACUTE PAIN
TYPE: ACUTE PAIN;SURGICAL PAIN

## 2023-11-08 ASSESSMENT — FIBROSIS 4 INDEX: FIB4 SCORE: 0.85

## 2023-11-08 ASSESSMENT — COGNITIVE AND FUNCTIONAL STATUS - GENERAL
STANDING UP FROM CHAIR USING ARMS: A LITTLE
TURNING FROM BACK TO SIDE WHILE IN FLAT BAD: A LITTLE
MOVING TO AND FROM BED TO CHAIR: A LOT
MOVING FROM LYING ON BACK TO SITTING ON SIDE OF FLAT BED: A LITTLE
WALKING IN HOSPITAL ROOM: A LITTLE
MOBILITY SCORE: 16
SUGGESTED CMS G CODE MODIFIER DAILY ACTIVITY: CK
DAILY ACTIVITIY SCORE: 18
TOILETING: A LOT
DRESSING REGULAR LOWER BODY CLOTHING: A LOT
CLIMB 3 TO 5 STEPS WITH RAILING: A LOT
HELP NEEDED FOR BATHING: A LITTLE
DRESSING REGULAR UPPER BODY CLOTHING: A LITTLE
SUGGESTED CMS G CODE MODIFIER MOBILITY: CK

## 2023-11-08 ASSESSMENT — LIFESTYLE VARIABLES: ALCOHOL_USE: NO

## 2023-11-08 ASSESSMENT — PAIN SCALES - GENERAL: PAIN_LEVEL: 2

## 2023-11-08 NOTE — ANESTHESIA PREPROCEDURE EVALUATION
Case: [id number] Date/Time: 11/08/23 1400    Procedures:       EXPLORATORY LAPAROTOMY, REMOVAL OF INFECTED MESH, COMPLEX REPAIR INCISIONAL HERNIA WITH BIOLOGIC GRAFT, SMALL BOWEL RESECTION, INSICION AND DRAINAGE OF ABSCESS      REPAIR, HERNIA, INCISIONAL    Pre-op diagnosis: INFECTED MESH    Location: [facility] OR 10 / SURGERY [facility]    Surgeons: Louie Wei M.D.            Relevant Problems   No relevant active problems       Physical Exam    Airway   Mallampati: II  TM distance: >3 FB  Neck ROM: full       Cardiovascular - normal exam  Rhythm: regular  Rate: normal  (-) murmur     Dental - normal exam           Pulmonary - normal exam  Breath sounds clear to auscultation     Abdominal    Neurological - normal exam                   Anesthesia Plan    ASA 2       Plan - general       Airway plan will be ETT          Induction: intravenous    Postoperative Plan: Postoperative administration of opioids is intended.    Pertinent diagnostic labs and testing reviewed    Informed Consent:    Anesthetic plan and risks discussed with patient.    Use of blood products discussed with: patient whom consented to blood products.

## 2023-11-08 NOTE — ANESTHESIA PROCEDURE NOTES
Airway    Date/Time: 11/8/2023 3:44 PM    Performed by: Keven Caro M.D.  Authorized by: Keven Caro M.D.    Location:  OR  Urgency:  Elective  Indications for Airway Management:  Anesthesia      Spontaneous Ventilation: absent    Sedation Level:  Deep  Preoxygenated: Yes    Patient Position:  Sniffing  Final Airway Type:  Endotracheal airway  Final Endotracheal Airway:  ETT  Cuffed: Yes    Technique Used for Successful ETT Placement:  Direct laryngoscopy    Insertion Site:  Oral  Blade Type:  Gabriela  Laryngoscope Blade/Videolaryngoscope Blade Size:  3  ETT Size (mm):  7.5  Measured from:  Teeth  ETT to Teeth (cm):  22  Placement Verified by: auscultation and capnometry    Cormack-Lehane Classification:  Grade I - full view of glottis  Number of Attempts at Approach:  1

## 2023-11-08 NOTE — PROGRESS NOTES
Med Rec completed per patient   Allergies reviewed-no changes  Antibiotics in the past 30 days:YES  Anticoagulant in past 14 days:NO  Pharmacy patient utilizes:[facility] in [place]    Pt states she is taking an Antibiotic BID for 14 DS. Pt states she just started the course a couple days ago.Pt unable to verify name/strength. Pt states she picked up Antibiotic at [facility]    Attempted to contact [facility] to verify Antibiotic but no answer from pharmacy    Pt states she has been out of town so has not had a recent appt with her Dr for the hormone tablet

## 2023-11-09 LAB
ALBUMIN SERPL BCP-MCNC: 3.3 G/DL (ref 3.2–4.9)
ALBUMIN/GLOB SERPL: 1.2 G/DL
ALP SERPL-CCNC: 90 U/L (ref 30–99)
ALT SERPL-CCNC: 12 U/L (ref 2–50)
ANION GAP SERPL CALC-SCNC: 7 MMOL/L (ref 7–16)
AST SERPL-CCNC: 12 U/L (ref 12–45)
BILIRUB SERPL-MCNC: 0.4 MG/DL (ref 0.1–1.5)
BUN SERPL-MCNC: 11 MG/DL (ref 8–22)
CALCIUM ALBUM COR SERPL-MCNC: 9 MG/DL (ref 8.5–10.5)
CALCIUM SERPL-MCNC: 8.4 MG/DL (ref 8.5–10.5)
CHLORIDE SERPL-SCNC: 104 MMOL/L (ref 96–112)
CO2 SERPL-SCNC: 25 MMOL/L (ref 20–33)
CREAT SERPL-MCNC: 0.49 MG/DL (ref 0.5–1.4)
ERYTHROCYTE [DISTWIDTH] IN BLOOD BY AUTOMATED COUNT: 45 FL (ref 35.9–50)
FUNGUS SPEC FUNGUS STN: NORMAL
FUNGUS SPEC FUNGUS STN: NORMAL
GFR SERPLBLD CREATININE-BSD FMLA CKD-EPI: 98 ML/MIN/1.73 M 2
GLOBULIN SER CALC-MCNC: 2.8 G/DL (ref 1.9–3.5)
GLUCOSE SERPL-MCNC: 159 MG/DL (ref 65–99)
GRAM STN SPEC: NORMAL
HCT VFR BLD AUTO: 37.3 % (ref 37–47)
HGB BLD-MCNC: 12 G/DL (ref 12–16)
MCH RBC QN AUTO: 31.8 PG (ref 27–33)
MCHC RBC AUTO-ENTMCNC: 32.2 G/DL (ref 32.2–35.5)
MCV RBC AUTO: 98.9 FL (ref 81.4–97.8)
PATHOLOGY CONSULT NOTE: NORMAL
PLATELET # BLD AUTO: 283 K/UL (ref 164–446)
PMV BLD AUTO: 10.4 FL (ref 9–12.9)
POTASSIUM SERPL-SCNC: 4.9 MMOL/L (ref 3.6–5.5)
PROT SERPL-MCNC: 6.1 G/DL (ref 6–8.2)
RBC # BLD AUTO: 3.77 M/UL (ref 4.2–5.4)
SIGNIFICANT IND 70042: NORMAL
SITE SITE: NORMAL
SODIUM SERPL-SCNC: 136 MMOL/L (ref 135–145)
SOURCE SOURCE: NORMAL
WBC # BLD AUTO: 10.5 K/UL (ref 4.8–10.8)

## 2023-11-09 PROCEDURE — 36415 COLL VENOUS BLD VENIPUNCTURE: CPT

## 2023-11-09 PROCEDURE — 770001 HCHG ROOM/CARE - MED/SURG/GYN PRIV*

## 2023-11-09 PROCEDURE — 700102 HCHG RX REV CODE 250 W/ 637 OVERRIDE(OP): Performed by: NURSE PRACTITIONER

## 2023-11-09 PROCEDURE — A9270 NON-COVERED ITEM OR SERVICE: HCPCS | Performed by: NURSE PRACTITIONER

## 2023-11-09 PROCEDURE — 700102 HCHG RX REV CODE 250 W/ 637 OVERRIDE(OP): Performed by: STUDENT IN AN ORGANIZED HEALTH CARE EDUCATION/TRAINING PROGRAM

## 2023-11-09 PROCEDURE — 700105 HCHG RX REV CODE 258: Performed by: NURSE PRACTITIONER

## 2023-11-09 PROCEDURE — A9270 NON-COVERED ITEM OR SERVICE: HCPCS | Performed by: COLON & RECTAL SURGERY

## 2023-11-09 PROCEDURE — 700111 HCHG RX REV CODE 636 W/ 250 OVERRIDE (IP): Performed by: NURSE PRACTITIONER

## 2023-11-09 PROCEDURE — 80053 COMPREHEN METABOLIC PANEL: CPT

## 2023-11-09 PROCEDURE — A9270 NON-COVERED ITEM OR SERVICE: HCPCS | Performed by: STUDENT IN AN ORGANIZED HEALTH CARE EDUCATION/TRAINING PROGRAM

## 2023-11-09 PROCEDURE — 85027 COMPLETE CBC AUTOMATED: CPT

## 2023-11-09 PROCEDURE — 700102 HCHG RX REV CODE 250 W/ 637 OVERRIDE(OP): Performed by: COLON & RECTAL SURGERY

## 2023-11-09 RX ORDER — METAXALONE 800 MG/1
800 TABLET ORAL 3 TIMES DAILY
Status: DISCONTINUED | OUTPATIENT
Start: 2023-11-09 | End: 2023-11-13 | Stop reason: HOSPADM

## 2023-11-09 RX ORDER — METRONIDAZOLE 500 MG/1
500 TABLET ORAL EVERY 12 HOURS
Status: COMPLETED | OUTPATIENT
Start: 2023-11-09 | End: 2023-11-11

## 2023-11-09 RX ADMIN — HYDROXYZINE HYDROCHLORIDE 10 MG: 10 TABLET ORAL at 05:17

## 2023-11-09 RX ADMIN — POTASSIUM CHLORIDE: 2 INJECTION, SOLUTION, CONCENTRATE INTRAVENOUS at 23:34

## 2023-11-09 RX ADMIN — GABAPENTIN 300 MG: 300 CAPSULE ORAL at 12:42

## 2023-11-09 RX ADMIN — METRONIDAZOLE 500 MG: 500 INJECTION, SOLUTION INTRAVENOUS at 09:27

## 2023-11-09 RX ADMIN — ENOXAPARIN SODIUM 40 MG: 100 INJECTION SUBCUTANEOUS at 09:25

## 2023-11-09 RX ADMIN — ACETAMINOPHEN 1000 MG: 500 TABLET, FILM COATED ORAL at 23:36

## 2023-11-09 RX ADMIN — CEFAZOLIN 2 G: 2 INJECTION, POWDER, FOR SOLUTION INTRAMUSCULAR; INTRAVENOUS at 23:38

## 2023-11-09 RX ADMIN — Medication 1 APPLICATOR: at 16:47

## 2023-11-09 RX ADMIN — Medication 1 APPLICATOR: at 06:27

## 2023-11-09 RX ADMIN — POTASSIUM CHLORIDE: 2 INJECTION, SOLUTION, CONCENTRATE INTRAVENOUS at 12:39

## 2023-11-09 RX ADMIN — HYDROXYZINE HYDROCHLORIDE 10 MG: 10 TABLET ORAL at 16:48

## 2023-11-09 RX ADMIN — GABAPENTIN 300 MG: 300 CAPSULE ORAL at 16:46

## 2023-11-09 RX ADMIN — CEFAZOLIN 2 G: 2 INJECTION, POWDER, FOR SOLUTION INTRAMUSCULAR; INTRAVENOUS at 01:08

## 2023-11-09 RX ADMIN — METAXALONE 800 MG: 800 TABLET ORAL at 16:46

## 2023-11-09 RX ADMIN — CEFAZOLIN 2 G: 2 INJECTION, POWDER, FOR SOLUTION INTRAMUSCULAR; INTRAVENOUS at 16:52

## 2023-11-09 RX ADMIN — ACETAMINOPHEN 1000 MG: 10 INJECTION INTRAVENOUS at 00:29

## 2023-11-09 RX ADMIN — ACETAMINOPHEN 1000 MG: 10 INJECTION INTRAVENOUS at 05:17

## 2023-11-09 RX ADMIN — FAMOTIDINE 20 MG: 10 INJECTION, SOLUTION INTRAVENOUS at 06:27

## 2023-11-09 RX ADMIN — ACETAMINOPHEN 1000 MG: 500 TABLET, FILM COATED ORAL at 12:43

## 2023-11-09 RX ADMIN — CEFAZOLIN 2 G: 2 INJECTION, POWDER, FOR SOLUTION INTRAMUSCULAR; INTRAVENOUS at 08:31

## 2023-11-09 RX ADMIN — ACETAMINOPHEN 1000 MG: 500 TABLET, FILM COATED ORAL at 16:46

## 2023-11-09 RX ADMIN — FAMOTIDINE 20 MG: 10 INJECTION, SOLUTION INTRAVENOUS at 16:47

## 2023-11-09 RX ADMIN — GABAPENTIN 300 MG: 300 CAPSULE ORAL at 06:27

## 2023-11-09 RX ADMIN — FLUOXETINE HYDROCHLORIDE 40 MG: 20 SOLUTION ORAL at 06:27

## 2023-11-09 RX ADMIN — METRONIDAZOLE 500 MG: 500 TABLET ORAL at 17:21

## 2023-11-09 RX ADMIN — METAXALONE 800 MG: 800 TABLET ORAL at 12:43

## 2023-11-09 ASSESSMENT — ENCOUNTER SYMPTOMS
COUGH: 0
SHORTNESS OF BREATH: 0
ABDOMINAL PAIN: 1
FEVER: 0
DIARRHEA: 0
VOMITING: 0
NAUSEA: 0
CHILLS: 0
HEARTBURN: 0
PALPITATIONS: 0

## 2023-11-09 ASSESSMENT — PAIN DESCRIPTION - PAIN TYPE
TYPE: ACUTE PAIN
TYPE: ACUTE PAIN;SURGICAL PAIN
TYPE: ACUTE PAIN

## 2023-11-09 NOTE — PROGRESS NOTES
Patient arrived to the floor via transport with belongings, family in room. Patient is A&Ox4. Pain rating 3/10. Plan of care discussed with the patient, all concerns addressed. Call light is within reach and patient educated on fall precautions, verbalized and demonstrated understanding. Bed in lowest and locked position. Hourly rounding in place.

## 2023-11-09 NOTE — PROGRESS NOTES
Surgical Progress Note    Author: Audra Cartagena P.A.-C. 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     POD#1 Exploratory laparotomy,  Extensive adhesiolysis, Drainage of complex abdominal wall subfascial abscess, Excision of infected mesh,  Resection of segment of small bowel, fistula, and chronic abscess wall rind and omentum.   Patient is progressing as expected.      Pain is somewhat controlled with Dilaudid PCA, still a fair amount of pain.  Low dose dilaudid PCA secondary to soft Bps, currently 98/44.   She is tolerating clear liquids at this time.   UO jiang with clear urine output.    Midline incision with prevena in place to good negative pressure.  JAMES with 240cc serosanguinous output.   She is currenty on ancef and flagyl, cultures from surgery pending.       She is nonambulatory at this time.        Review of Systems   Constitutional:  Negative for chills and fever.   Respiratory:  Negative for cough and shortness of breath.    Cardiovascular:  Negative for chest pain and palpitations.   Gastrointestinal:  Positive for abdominal pain (incisional). Negative for diarrhea, heartburn, nausea and vomiting.   Genitourinary:  Negative for dysuria.     Hemodynamics:  Temp (24hrs), Av.7 °C (98 °F), Min:36.4 °C (97.5 °F), Max:36.8 °C (98.2 °F)  Temperature: 36.4 °C (97.5 °F)  Pulse  Av.4  Min: 63  Max: 81   Blood Pressure : 98/44 (Rn notified, bp taken twice )     Respiratory:    Respiration: 16, Pulse Oximetry: 96 %     Work Of Breathing / Effort: Shallow     Neuro:  GCS       Fluids:    Intake/Output Summary (Last 24 hours) at 2023 0779  Last data filed at 2023 041  Gross per 24 hour   Intake 1800 ml   Output 1465 ml   Net 335 ml     Weight: 73.2 kg (161 lb 6  oz)  Current Diet Order   Procedures    Diet Order Diet: Clear Liquid     Physical Exam  Constitutional:       Appearance: Normal appearance. She is obese.   HENT:      Head: Normocephalic and atraumatic.      Nose: Nose normal.      Mouth/Throat:      Mouth: Mucous membranes are moist.   Eyes:      Conjunctiva/sclera: Conjunctivae normal.   Cardiovascular:      Rate and Rhythm: Normal rate and regular rhythm.   Pulmonary:      Effort: Pulmonary effort is normal. No respiratory distress.   Abdominal:      General: There is distension.      Palpations: Abdomen is soft.      Tenderness: There is abdominal tenderness. There is no guarding or rebound.      Comments: Midline incision with prevena in place, good negative pressure.    JAMES LLQ with serosang output.     Genitourinary:     Comments: Flynn in place with clear output.    Musculoskeletal:         General: Normal range of motion.      Cervical back: Normal range of motion.   Skin:     General: Skin is warm and dry.      Coloration: Skin is not jaundiced.      Findings: No erythema or rash.   Neurological:      Mental Status: She is alert and oriented to person, place, and time.   Psychiatric:         Mood and Affect: Mood normal.       Labs:  Recent Results (from the past 24 hour(s))   CULTURE WOUND W/ GRAM STAIN    Collection Time: 11/08/23  4:07 PM    Specimen: Wound   Result Value Ref Range    Significant Indicator NEG     Source WND     Site Abdominal wall abscess     Culture Result -     Gram Stain Result Few WBCs.  No organisms seen.      Anaerobic Culture    Collection Time: 11/08/23  4:07 PM    Specimen: Wound   Result Value Ref Range    Significant Indicator NEG     Source WND     Site Abdominal wall abscess     Culture Result -    Fungal Culture    Collection Time: 11/08/23  4:07 PM    Specimen: Wound   Result Value Ref Range    Significant Indicator NEG     Source WND     Site Abdominal wall abscess     Culture Result -     Fungal Smear Results No  fungal elements seen.    GRAM STAIN    Collection Time: 11/08/23  4:07 PM    Specimen: Wound   Result Value Ref Range    Significant Indicator .     Source WND     Site Abdominal wall abscess     Gram Stain Result Few WBCs.  No organisms seen.      Fungal Smear    Collection Time: 11/08/23  4:07 PM    Specimen: Wound   Result Value Ref Range    Significant Indicator NEG     Source WND     Site Abdominal wall abscess     Fungal Smear Results No fungal elements seen.    CULTURE WOUND W/ GRAM STAIN    Collection Time: 11/08/23  4:08 PM    Specimen: Wound   Result Value Ref Range    Significant Indicator NEG     Source WND     Site Abdominal wall abscess     Culture Result -     Gram Stain Result Rare WBCs.  No organisms seen.      Anaerobic Culture    Collection Time: 11/08/23  4:08 PM    Specimen: Wound   Result Value Ref Range    Significant Indicator NEG     Source WND     Site Abdominal wall abscess     Culture Result -    Fungal Culture    Collection Time: 11/08/23  4:08 PM    Specimen: Wound   Result Value Ref Range    Significant Indicator NEG     Source WND     Site Abdominal wall abscess     Culture Result -     Fungal Smear Results No fungal elements seen.    GRAM STAIN    Collection Time: 11/08/23  4:08 PM    Specimen: Wound   Result Value Ref Range    Significant Indicator .     Source WND     Site Abdominal wall abscess     Gram Stain Result Rare WBCs.  No organisms seen.      Fungal Smear    Collection Time: 11/08/23  4:08 PM    Specimen: Wound   Result Value Ref Range    Significant Indicator NEG     Source WND     Site Abdominal wall abscess     Fungal Smear Results No fungal elements seen.      Medical Decision Making, by Problem:  There are no active hospital problems to display for this patient.    Plan:  -patient seen and examined this morning.   -Vitals stable, soft BP on low dose dilaudid PCA.    -Labs pending this morning.    -Incision with prevena in place, good negative pressure.    -Monitor JAMES  output, currently serosang output.    -pain control with dilaudid PCA  - IV abx-  Ancef and Flagyl, cultures pending.    -DC jiang today    Patient seen and examined by Dr. Wei         Quality Measures:  Quality-Core Measures   Reviewed items::  Labs reviewed  Jiang catheter::  No Jiang and One or Two Days Post Surgery (Day of Surgery being Day 0)  DVT prophylaxis pharmacological::  Enoxaparin (Lovenox)  DVT prophylaxis - mechanical:  SCDs  Ulcer Prophylaxis::  Yes  Antibiotics:  Treating active infection/contamination beyond 24 hours perioperative coverage      Discussed patient condition with Patient and Dr Wei

## 2023-11-09 NOTE — CARE PLAN
The patient is Watcher - Medium risk of patient condition declining or worsening    Shift Goals  Clinical Goals: pain control, monitor drain output  Patient Goals: pain control  Family Goals: patient comfort    Progress made toward(s) clinical / shift goals:    Problem: Pain - Standard  Goal: Alleviation of pain or a reduction in pain to the patient’s comfort goal  Outcome: Progressing   Patient being medicated per the MAR with a PCA pump.   Problem: Knowledge Deficit - Standard  Goal: Patient and family/care givers will demonstrate understanding of plan of care, disease process/condition, diagnostic tests and medications  Outcome: Progressing     Problem: Fall Risk  Goal: Patient will remain free from falls  Outcome: Progressing       Patient is not progressing towards the following goals:

## 2023-11-09 NOTE — OP REPORT
DATE OF SERVICE:  11/08/2023     PREOPERATIVE DIAGNOSES:  1.  Complex left abdominal wall subfascial abscess.  2.  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  3.  History of complex abdominal wall reconstructions and incisional hernia   repairs with mesh.     POSTOPERATIVE DIAGNOSES:  1.  Complex abdominal wall and subfascial abscess.  2.  Small bowel fistula.  3.  Intra-abdominal adhesions.  4.  Infected mesh material.     OPERATIONS PERFORMED:  1.  Exploratory laparotomy.  2.  Extensive adhesiolysis.  3.  Drainage of complex abdominal wall subfascial abscess.  4.  Excision of infected mesh.  5.  Resection of segment of small bowel, fistula, and chronic abscess wall   rind and omentum.     SURGEON:  Louie Wei MD     ASSISTANT:  UZMA Hernandez     ANESTHESIOLOGIST:  Keven Caro MD     INDICATIONS FOR PROCEDURE:  The patient is a 73-year-old female who had a   complex past surgical history of abdominal wall reconstructions and incisional   hernia repairs with mesh.  She has developed left-sided pain and imaging   demonstrating a complex-appearing deep abdominal wall subfascial fluid   collection consistent with abscess.  She comes today for surgical exploration   and treatment.  We have discussed in detail that this may involve adhesions   and small bowel or large bowel fistulization.     DETAILS OF PROCEDURE:  After an extensive informed consent discussion process,   the patient was brought to the operating room.  She was placed in a supine   position on the operating table.  After induction of general anesthesia and   placement of an endotracheal tube, the abdomen was prepped and draped in the   usual sterile fashion.  After administration of intravenous antibiotics, an   initial limited midline incision was made, curving to the left around the   umbilicus through the prior scar.  Dissection was carried down through the   subcutaneous tissues and down to the mesh itself.  These areas exhibited no   evidence of  abscess or fluid tracking along the mesh in this area.     The mesh was opened in the midline and care was then taken to lyse the   adhesions to the anterior abdominal wall until we had opened the midline of   the abdomen, approximately 6-8 cm.     Then, exploring the abdominal wall and pulling up the CT scan images, the area   in question was palpable at the deep abdominal wall or subfascial fullness   firm mass type process.  Gradually, the adhesiolysis was performed.  It was   clear that this was a fairly large matted rind of omentum and chronic   inflammation leading to the abscess cavity itself.  It required us to extend   the incision superiorly and inferiorly and opened more of the midline mesh.    The abdominal wall mesh in the midline and for many centimeters to the right   and left was entirely densely incorporated within the abdominal wall and there   was no fluid tracking around it.     Gradually, the adhesions were lysed until we closed in on the area in question   in the left lower quadrant.  Here, there was a chronic abscess cavity   involving omentum and a chronic inflammatory rind and a segment of small   bowel, which had fistulized to the exposed mesh.  The mesh was still densely   adherent and incorporated to the abdominal wall and we could not release it at   all without using the heaviest curved Stubbs scissors to cut out this mesh.    The cavity had a aicha pus, some of which was collected for microbiology.  All   the mesh in the region that was in any way exposed to and for many   centimeters wide of this area was cut out with these very heavy scissors.     Next, the adhesions were lysed in order to understand and free up the small   bowel as it entered and exited this mass of inflammation and abscess cavity.    Ultimately, we isolated it down to a relatively short segment of small bowel   measuring less than 10 cm in the obvious fistula site.     The small bowel segmental resection was  performed.  The bowel entering and   exiting the area was divided with the GELY stapler and the mesentery was   controlled with the LigaSure device.  A significant omentectomy and resection   of an inflammatory rind of the abscess wall and all of the chronic mass was   excised, mostly using the LigaSure device.  Adhesiolysis was performed with   cold scissors sharply to stay well away from any additional large or small   bowel.  There were no enterotomies or colon defects or injuries during the   case.  Once this process was all resected, the specimens consisting of the   infected mesh and fistula, as well as the small bowel segment and abscess rind   cavity and omentum was passed off the field for pathology.     Copious warm irrigation was used to washout the abdomen.  There had been no   generalized spillage of any of the pus.  The abdominal wall was examined   carefully.  I carefully weighed whether to try to excise more of the mesh, but   felt that the safest approach was to not proceed with further excision, which   would leave her with quite damaged and fragile abdominal wall more so than it   already is, given how densely adherent this mesh is to the existing abdominal   wall.  There was no leak of fluid and there was no traveling of the cavity,   which was contained to the area we had already addressed.     The small bowel was quite free.  We made sure there was no distal _____   obstructions.  Further omentum was mobilized from its attachments in the right   abdomen, so that it could move over to the area where we had just excised the   inflammatory process.  A #10 flat fluted Colin drain was placed in the prior   cavity. While there was no truly robust fascia in this left lower quadrant   area, there was some chronic scar tissue and previously reconstructed muscle   and now we placed the omentum at this area.  It did not appear that it would   benefit from placing a temporary or biological mesh graft.  The  drain was   brought out through a left lower quadrant skin incision and secured at the   skin level with 2-0 nylon suture.  The abdominal midline fascia and old mesh   was closed with #2 Vicryl suture.  The wound was vigorously irrigated and the   skin was closed with skin staples.  A Prevena negative pressure dressing was   then applied.     Needle, sponge, and instrument counts were correct at the end of the case.     ESTIMATED BLOOD LOSS:  Less than 150 mL     COMPLICATIONS:  None.        ______________________________  MD MAYI JOHNS/TYESHA    DD:  11/08/2023 16:55  DT:  11/08/2023 17:17    Job#:  [id number]

## 2023-11-09 NOTE — ANESTHESIA PROCEDURE NOTES
Peripheral Block    Date/Time: 11/8/2023 4:48 PM    Performed by: Keven Caro M.D.  Authorized by: Keven Caro M.D.    Patient Location:  OR  Start Time:  11/8/2023 4:48 PM  End Time:  11/8/2023 4:52 PM  Reason for Block: at surgeon's request and post-op pain management ONLY    patient identified, IV checked, site marked, risks and benefits discussed, surgical consent, monitors and equipment checked, pre-op evaluation and timeout performed    Patient Position:  Supine  Prep: ChloraPrep    Monitoring:  Heart rate, continuous pulse ox and cardiac monitor  Block Region:  Trunk  Trunk - Block Type:  Abdominal plane block - TAP block    Laterality:  Bilateral  Procedures: ultrasound guided  Image captured, interpreted and electronically stored.  Local Infiltration:  Lidocaine  Strength:  1 %  Dose:  3 ml  Block Type:  Single-shot  Needle Length:  100mm  Needle Gauge:  21 G  Needle Localization:  Ultrasound guidance  Ultrasound picture in chart  Injection Assessment:  Negative aspiration for heme, no paresthesia on injection, incremental injection and local visualized surrounding nerve on ultrasound

## 2023-11-09 NOTE — ANESTHESIA TIME REPORT
Anesthesia Start and Stop Event Times       Date Time Event    11/8/2023 1503 Ready for Procedure     1540 Anesthesia Start     1712 Anesthesia Stop          Responsible Staff  11/08/23      Name Role Begin End    Keven Caro M.D. Anesth 1540 1712          Overtime Reason:  no overtime (within assigned shift)    Comments: Gordo Bilateral TAP blocks in OR late call

## 2023-11-09 NOTE — CARE PLAN
The patient is Watcher - Medium risk of patient condition declining or worsening    Shift Goals  Clinical Goals: pain control, monitor drain output, remove jiang  Patient Goals: pain control, rest  Family Goals: patient comfort    Progress made toward(s) clinical / shift goals:    Problem: Pain - Standard  Goal: Alleviation of pain or a reduction in pain to the patient’s comfort goal  Outcome: Progressing   Dilaudid PCA in use with +results. Educated pt on importance of pain control- pt verbalized understanding.   Problem: Knowledge Deficit - Standard  Goal: Patient and family/care givers will demonstrate understanding of plan of care, disease process/condition, diagnostic tests and medications  Outcome: Progressing   POC discussed-pt verbalized understanding.    Patient is not progressing towards the following goals:

## 2023-11-09 NOTE — PROGRESS NOTES
4 Eyes Skin Assessment Completed by LEO Mar and LEO Ang.    Head WDL  Ears WDL  Nose WDL  Mouth WDL  Neck WDL  Breast/Chest WDL  Shoulder Blades WDL  Spine WDL  (R) Arm/Elbow/Hand WDL  (L) Arm/Elbow/Hand WDL  Abdomen Incision, prevena, JAMES drain site  Groin Redness  Scrotum/Coccyx/Buttocks WDL  (R) Leg WDL  (L) Leg WDL  (R) Heel/Foot/Toe WDL  (L) Heel/Foot/Toe WDL          Devices In Places Blood Pressure Cuff, Pulse Ox, SCD's, and Nasal Cannula      Interventions In Place Gray Ear Foams, Pillows, and Low Air Loss Mattress    Possible Skin Injury No    Pictures Uploaded Into Epic N/A  Wound Consult Placed N/A  RN Wound Prevention Protocol Ordered No

## 2023-11-09 NOTE — ANESTHESIA POSTPROCEDURE EVALUATION
Patient: Niya Stone    Procedure Summary       Date: 11/08/23 Room / Location: [place] [address] / SURGERY [facility]    Anesthesia Start: 1540 Anesthesia Stop: 1712    Procedures:       EXPLORATORY LAPAROTOMY, REMOVAL OF INFECTED MESH,  SMALL BOWEL RESECTION, INSICION AND DRAINAGE OF ABSCESS (Abdomen)      REPAIR, COMPLEX HERNIA, INCISIONAL (Abdomen) Diagnosis: (INFECTED MESH, small bowel fistula)    Surgeons: Louie Wei M.D. Responsible Provider: Keven Caro M.D.    Anesthesia Type: general ASA Status: 2            Final Anesthesia Type: general  Last vitals  BP   Blood Pressure : 130/58    Temp   36.7 °C (98.1 °F)    Pulse   81   Resp   16    SpO2   95 %      Anesthesia Post Evaluation    Patient location during evaluation: PACU  Patient participation: complete - patient participated  Level of consciousness: awake and alert  Pain score: 2    Airway patency: patent  Anesthetic complications: no  Cardiovascular status: hemodynamically stable  Respiratory status: acceptable  Hydration status: euvolemic    PONV: none          No notable events documented.     Nurse Pain Score: 2 (NPRS)           Pt ambulated to BR

## 2023-11-09 NOTE — OR NURSING
Pt resting btwn care. VSS on 2 L O2 via NC. Pt reports mid-abdominal pain 7/10, however she states pain is tolerable at this time and declines additional pain medication. Pt able to take deep breaths and is pulling 2000 on her incentive spirometer.   Prevena wound vac patent and drsg intact.   140cc serosanguinous drainage emptied from pt's JAMES.  350cc urine output from jiang catheter.   Waiting for inpatient room.   Pt's daughter called and updated.   Eye glasses in place. Pt's daughter confirms she has the rest of pt's belongings.  Bedside handoff given to LEO Bryant.

## 2023-11-10 LAB
ALBUMIN SERPL BCP-MCNC: 3.1 G/DL (ref 3.2–4.9)
ALBUMIN/GLOB SERPL: 1.1 G/DL
ALP SERPL-CCNC: 77 U/L (ref 30–99)
ALT SERPL-CCNC: 9 U/L (ref 2–50)
ANION GAP SERPL CALC-SCNC: 7 MMOL/L (ref 7–16)
AST SERPL-CCNC: 14 U/L (ref 12–45)
BILIRUB SERPL-MCNC: 0.2 MG/DL (ref 0.1–1.5)
BUN SERPL-MCNC: 9 MG/DL (ref 8–22)
CALCIUM ALBUM COR SERPL-MCNC: 8.8 MG/DL (ref 8.5–10.5)
CALCIUM SERPL-MCNC: 8.1 MG/DL (ref 8.5–10.5)
CHLORIDE SERPL-SCNC: 103 MMOL/L (ref 96–112)
CO2 SERPL-SCNC: 27 MMOL/L (ref 20–33)
CREAT SERPL-MCNC: 0.54 MG/DL (ref 0.5–1.4)
ERYTHROCYTE [DISTWIDTH] IN BLOOD BY AUTOMATED COUNT: 47.4 FL (ref 35.9–50)
GFR SERPLBLD CREATININE-BSD FMLA CKD-EPI: 96 ML/MIN/1.73 M 2
GLOBULIN SER CALC-MCNC: 2.9 G/DL (ref 1.9–3.5)
GLUCOSE SERPL-MCNC: 119 MG/DL (ref 65–99)
HCT VFR BLD AUTO: 35.2 % (ref 37–47)
HGB BLD-MCNC: 10.9 G/DL (ref 12–16)
MCH RBC QN AUTO: 31.1 PG (ref 27–33)
MCHC RBC AUTO-ENTMCNC: 31 G/DL (ref 32.2–35.5)
MCV RBC AUTO: 100.6 FL (ref 81.4–97.8)
PLATELET # BLD AUTO: 270 K/UL (ref 164–446)
PMV BLD AUTO: 9.9 FL (ref 9–12.9)
POTASSIUM SERPL-SCNC: 4.6 MMOL/L (ref 3.6–5.5)
PROT SERPL-MCNC: 6 G/DL (ref 6–8.2)
RBC # BLD AUTO: 3.5 M/UL (ref 4.2–5.4)
SODIUM SERPL-SCNC: 137 MMOL/L (ref 135–145)
WBC # BLD AUTO: 15.1 K/UL (ref 4.8–10.8)

## 2023-11-10 PROCEDURE — 700105 HCHG RX REV CODE 258: Performed by: NURSE PRACTITIONER

## 2023-11-10 PROCEDURE — A9270 NON-COVERED ITEM OR SERVICE: HCPCS | Performed by: STUDENT IN AN ORGANIZED HEALTH CARE EDUCATION/TRAINING PROGRAM

## 2023-11-10 PROCEDURE — 700102 HCHG RX REV CODE 250 W/ 637 OVERRIDE(OP): Performed by: NURSE PRACTITIONER

## 2023-11-10 PROCEDURE — A9270 NON-COVERED ITEM OR SERVICE: HCPCS | Performed by: NURSE PRACTITIONER

## 2023-11-10 PROCEDURE — 700102 HCHG RX REV CODE 250 W/ 637 OVERRIDE(OP): Performed by: STUDENT IN AN ORGANIZED HEALTH CARE EDUCATION/TRAINING PROGRAM

## 2023-11-10 PROCEDURE — 85027 COMPLETE CBC AUTOMATED: CPT

## 2023-11-10 PROCEDURE — 770001 HCHG ROOM/CARE - MED/SURG/GYN PRIV*

## 2023-11-10 PROCEDURE — 700111 HCHG RX REV CODE 636 W/ 250 OVERRIDE (IP): Mod: JZ | Performed by: NURSE PRACTITIONER

## 2023-11-10 PROCEDURE — 80053 COMPREHEN METABOLIC PANEL: CPT

## 2023-11-10 PROCEDURE — 700102 HCHG RX REV CODE 250 W/ 637 OVERRIDE(OP): Performed by: COLON & RECTAL SURGERY

## 2023-11-10 PROCEDURE — A9270 NON-COVERED ITEM OR SERVICE: HCPCS | Performed by: COLON & RECTAL SURGERY

## 2023-11-10 PROCEDURE — 36415 COLL VENOUS BLD VENIPUNCTURE: CPT

## 2023-11-10 RX ADMIN — POTASSIUM CHLORIDE: 2 INJECTION, SOLUTION, CONCENTRATE INTRAVENOUS at 11:51

## 2023-11-10 RX ADMIN — FAMOTIDINE 20 MG: 10 INJECTION, SOLUTION INTRAVENOUS at 04:41

## 2023-11-10 RX ADMIN — Medication: at 11:48

## 2023-11-10 RX ADMIN — Medication 1 APPLICATOR: at 16:37

## 2023-11-10 RX ADMIN — ACETAMINOPHEN 1000 MG: 500 TABLET, FILM COATED ORAL at 16:37

## 2023-11-10 RX ADMIN — HYDROXYZINE HYDROCHLORIDE 10 MG: 10 TABLET ORAL at 03:16

## 2023-11-10 RX ADMIN — FAMOTIDINE 20 MG: 10 INJECTION, SOLUTION INTRAVENOUS at 16:37

## 2023-11-10 RX ADMIN — GABAPENTIN 300 MG: 300 CAPSULE ORAL at 11:52

## 2023-11-10 RX ADMIN — METRONIDAZOLE 500 MG: 500 TABLET ORAL at 16:37

## 2023-11-10 RX ADMIN — METAXALONE 800 MG: 800 TABLET ORAL at 11:52

## 2023-11-10 RX ADMIN — CEFAZOLIN 2 G: 2 INJECTION, POWDER, FOR SOLUTION INTRAMUSCULAR; INTRAVENOUS at 16:36

## 2023-11-10 RX ADMIN — FLUOXETINE HYDROCHLORIDE 40 MG: 20 SOLUTION ORAL at 04:42

## 2023-11-10 RX ADMIN — POTASSIUM CHLORIDE: 2 INJECTION, SOLUTION, CONCENTRATE INTRAVENOUS at 22:38

## 2023-11-10 RX ADMIN — GABAPENTIN 300 MG: 300 CAPSULE ORAL at 16:37

## 2023-11-10 RX ADMIN — GABAPENTIN 300 MG: 300 CAPSULE ORAL at 04:41

## 2023-11-10 RX ADMIN — CEFAZOLIN 2 G: 2 INJECTION, POWDER, FOR SOLUTION INTRAMUSCULAR; INTRAVENOUS at 07:47

## 2023-11-10 RX ADMIN — ACETAMINOPHEN 1000 MG: 500 TABLET, FILM COATED ORAL at 04:49

## 2023-11-10 RX ADMIN — METAXALONE 800 MG: 800 TABLET ORAL at 04:41

## 2023-11-10 RX ADMIN — METAXALONE 800 MG: 800 TABLET ORAL at 16:37

## 2023-11-10 RX ADMIN — Medication 1 APPLICATOR: at 04:44

## 2023-11-10 RX ADMIN — METRONIDAZOLE 500 MG: 500 TABLET ORAL at 04:41

## 2023-11-10 RX ADMIN — ACETAMINOPHEN 1000 MG: 500 TABLET, FILM COATED ORAL at 11:52

## 2023-11-10 RX ADMIN — ENOXAPARIN SODIUM 40 MG: 100 INJECTION SUBCUTANEOUS at 04:42

## 2023-11-10 ASSESSMENT — PAIN DESCRIPTION - PAIN TYPE
TYPE: ACUTE PAIN;SURGICAL PAIN

## 2023-11-10 ASSESSMENT — COPD QUESTIONNAIRES
DURING THE PAST 4 WEEKS HOW MUCH DID YOU FEEL SHORT OF BREATH: SOME OF THE TIME
DO YOU EVER COUGH UP ANY MUCUS OR PHLEGM?: YES, A FEW DAYS A WEEK OR MONTH
HAVE YOU SMOKED AT LEAST 100 CIGARETTES IN YOUR ENTIRE LIFE: YES
COPD SCREENING SCORE: 6

## 2023-11-10 NOTE — PROGRESS NOTES
"[facility]  Progress Note      POD #2, s/p Exploratory laparotomy,  Extensive adhesiolysis, Drainage of complex abdominal wall subfascial abscess, Excision of infected mesh,  Resection of segment of small bowel, fistula, and chronic abscess wall rind and omentum.    Subjective:   Pain is reasonably controlled with Dilaudid PCA. BP maintaining well. She is tolerating clear liquid diet at this time. She is feeling like she would like to eat more but is okay with clears for now. Midline incision with prevena in place with good negative pressure. JAMES with 100mL serosanguinous output.     Objective:    /60   Pulse 71   Temp 36.2 °C (97.2 °F) (Temporal)   Resp 18   Ht 1.676 m (5' 6\")   Wt 73.2 kg (161 lb 6 oz)   SpO2 96%     I/O last 3 completed shifts:  In: 1700 [P.O.:1700]  Out: 3350 [Urine:3150; Drains:200]    Current Facility-Administered Medications   Medication Dose    Nozin nasal  swab  1 Applicator    metaxalone (Skelaxin) tablet 800 mg  800 mg    metroNIDAZOLE (Flagyl) tablet 500 mg  500 mg    albuterol inhaler 2 Puff  2 Puff    FLUoxetine (PROzac) 20 MG/5ML solution 40 mg  40 mg    hydrOXYzine HCl (Atarax) tablet 10 mg  10 mg    Respiratory Therapy Consult      lactated ringers infusion (BOLUS)  500 mL    potassium chloride 20 mEq in LR 1,000 mL infusion      enoxaparin (Lovenox) inj 40 mg  40 mg    Pharmacy Consult Request ...Pain Management Review 1 Each  1 Each    acetaminophen (Tylenol) tablet 1,000 mg  1,000 mg    Followed by    [START ON 11/13/2023] acetaminophen (Tylenol) tablet 1,000 mg  1,000 mg    ondansetron (Zofran) syringe/vial injection 4 mg  4 mg    promethazine (Phenergan) suppository 25 mg  25 mg    diphenhydrAMINE (Benadryl) injection 12.5 mg  12.5 mg    enalaprilat (Vasotec) injection 2.5 mg 2 mL  2.5 mg    famotidine (Pepcid) injection 20 mg  20 mg    gabapentin (Neurontin) capsule 300 mg  300 mg    simethicone (Mylicon) chewable tablet 125 mg  125 mg    " benzocaine-menthol (Cepacol) lozenge 1 Lozenge  1 Lozenge    HYDROmorphone (DILAUDID) 0.2 mg/mL in 50 mL NS (PCA)      hydrALAZINE (Apresoline) injection 20 mg  20 mg    ceFAZolin (Ancef) 2 g in  mL IVPB  2 g     Physical Exam:     Constitutional:       Appearance: Normal appearance. She is obese.   HENT:      Head: Normocephalic and atraumatic.      Nose: Nose normal.      Mouth/Throat:      Mouth: Mucous membranes are moist.   Eyes:      Conjunctiva/sclera: Conjunctivae normal.   Cardiovascular:      Rate and Rhythm: Normal rate and regular rhythm.   Pulmonary:      Effort: Pulmonary effort is normal. No respiratory distress.   Abdominal:      General: There is mild distension.      Palpations: Abdomen is soft.      Tenderness: There is abdominal tenderness. There is no guarding or rebound.      Comments: Midline incision with prevena in place, good negative pressure.    JAMES LLQ with serosang output.       Musculoskeletal:         General: Normal range of motion.      Cervical back: Normal range of motion.   Skin:     General: Skin is warm and dry.      Coloration: Skin is not jaundiced.      Findings: No erythema or rash.   Neurological:      Mental Status: She is alert and oriented to person, place, and time.   Psychiatric:         Mood and Affect: Mood normal.    Gen - comfortable, NAD, A&O x 3  HEENT - anicteric, PERRLA  CV - regular rate and rhythm  Abd - soft, + min TTP @ epigastrium and RUQ, no rebound/guarding, no distention, no palp masses or bulges  Inc - all lap incisions are C/D/I - no evidence of infection or bulges; + min bruising at all incisions  Ext - no clubbing, cyanosis or edema bilaterally  Skin - no rashes/lesions, no open wounds, no jaundice    Labs:    Recent Labs     11/09/23  0801 11/10/23  0927   WBC 10.5 15.1*   RBC 3.77* 3.50*   HEMOGLOBIN 12.0 10.9*   HEMATOCRIT 37.3 35.2*   MCV 98.9* 100.6*   MCH 31.8 31.1   MCHC 32.2 31.0*   RDW 45.0 47.4   PLATELETCT 283 270   MPV 10.4 9.9      Recent Labs     11/09/23  0801 11/10/23  0927   SODIUM 136 137   POTASSIUM 4.9 4.6   CHLORIDE 104 103   CO2 25 27   GLUCOSE 159* 119*   BUN 11 9     Imaging:    No results found.    Assessment/Plan:    - Patient seen and examined this morning, labs reviewed  - Consult to ID for abx recommendations   - Continue liquid diet per Dr. Wei  - Incision with prevena in place, good negative pressure.    -Monitor JAMES output, currently serosang output.    -pain control with dilaudid PCA  - IV abx-  Ancef and Flagyl, cultures pending.

## 2023-11-10 NOTE — DISCHARGE PLANNING
Case Management Discharge Planning    Admission Date: 11/8/2023  GMLOS: 3.8  ALOS: 2    6-Clicks ADL Score: 18  6-Clicks Mobility Score: 16  PT and/or OT Eval ordered: No  Post-acute Referrals Ordered: No  Post-acute Choice Obtained: No  Has referral(s) been sent to post-acute provider:  No      Anticipated Discharge Dispo: Discharge Disposition: Discharged to home/self care (01)    DME Needed: No    Action(s) Taken: OTHER    Patient discussed in IDT rounds. Patient reportedly has no CM needs. Therefore, CM will no longer follow.

## 2023-11-10 NOTE — PROGRESS NOTES
Patient is A&Ox4. Plan of care discussed with the patient, all concerns addressed. Call light is within reach and patient educated on fall precautions, verbalized and demonstrated understanding. Bed in lowest and locked position. Hourly rounding in place.

## 2023-11-10 NOTE — CARE PLAN
The patient is Stable - Low risk of patient condition declining or worsening    Shift Goals  Clinical Goals: pain control, draint output, BP monitoring  Patient Goals: pain control, rest  Family Goals: patient comfort    Progress made toward(s) clinical / shift goals:    Problem: Pain - Standard  Goal: Alleviation of pain or a reduction in pain to the patient’s comfort goal  Outcome: Progressing     Problem: Knowledge Deficit - Standard  Goal: Patient and family/care givers will demonstrate understanding of plan of care, disease process/condition, diagnostic tests and medications  Outcome: Progressing     Problem: Fall Risk  Goal: Patient will remain free from falls  Outcome: Progressing       Patient is not progressing towards the following goals:

## 2023-11-10 NOTE — CARE PLAN
Problem: Pain - Standard  Goal: Alleviation of pain or a reduction in pain to the patient’s comfort goal  Outcome: Progressing   The patient is Stable - Low risk of patient condition declining or worsening    Shift Goals  Clinical Goals: pain control, draint output, BP monitoring  Patient Goals: pain control, rest  Family Goals: patient comfort    Progress made toward(s) clinical / shift goals:  yes    Patient is not progressing towards the following goals: n/a

## 2023-11-11 ENCOUNTER — APPOINTMENT (OUTPATIENT)
Dept: RADIOLOGY | Facility: MEDICAL CENTER | Age: 73
DRG: 907 | End: 2023-11-11
Attending: COLON & RECTAL SURGERY
Payer: COMMERCIAL

## 2023-11-11 LAB
ALBUMIN SERPL BCP-MCNC: 2.5 G/DL (ref 3.2–4.9)
ALBUMIN/GLOB SERPL: 0.8 G/DL
ALP SERPL-CCNC: 73 U/L (ref 30–99)
ALT SERPL-CCNC: 7 U/L (ref 2–50)
ANION GAP SERPL CALC-SCNC: 5 MMOL/L (ref 7–16)
AST SERPL-CCNC: 11 U/L (ref 12–45)
BACTERIA WND AEROBE CULT: ABNORMAL
BILIRUB SERPL-MCNC: 0.3 MG/DL (ref 0.1–1.5)
BUN SERPL-MCNC: 8 MG/DL (ref 8–22)
CALCIUM ALBUM COR SERPL-MCNC: 9.1 MG/DL (ref 8.5–10.5)
CALCIUM SERPL-MCNC: 7.9 MG/DL (ref 8.5–10.5)
CHLORIDE SERPL-SCNC: 102 MMOL/L (ref 96–112)
CO2 SERPL-SCNC: 28 MMOL/L (ref 20–33)
CREAT SERPL-MCNC: 0.28 MG/DL (ref 0.5–1.4)
ERYTHROCYTE [DISTWIDTH] IN BLOOD BY AUTOMATED COUNT: 46 FL (ref 35.9–50)
GFR SERPLBLD CREATININE-BSD FMLA CKD-EPI: 113 ML/MIN/1.73 M 2
GLOBULIN SER CALC-MCNC: 3 G/DL (ref 1.9–3.5)
GLUCOSE SERPL-MCNC: 93 MG/DL (ref 65–99)
GRAM STN SPEC: ABNORMAL
GRAM STN SPEC: ABNORMAL
HCT VFR BLD AUTO: 32.9 % (ref 37–47)
HGB BLD-MCNC: 10.3 G/DL (ref 12–16)
MCH RBC QN AUTO: 31 PG (ref 27–33)
MCHC RBC AUTO-ENTMCNC: 31.3 G/DL (ref 32.2–35.5)
MCV RBC AUTO: 99.1 FL (ref 81.4–97.8)
PLATELET # BLD AUTO: 244 K/UL (ref 164–446)
PMV BLD AUTO: 10.1 FL (ref 9–12.9)
POTASSIUM SERPL-SCNC: 4.1 MMOL/L (ref 3.6–5.5)
PROT SERPL-MCNC: 5.5 G/DL (ref 6–8.2)
RBC # BLD AUTO: 3.32 M/UL (ref 4.2–5.4)
SIGNIFICANT IND 70042: ABNORMAL
SIGNIFICANT IND 70042: ABNORMAL
SITE SITE: ABNORMAL
SITE SITE: ABNORMAL
SODIUM SERPL-SCNC: 135 MMOL/L (ref 135–145)
SOURCE SOURCE: ABNORMAL
SOURCE SOURCE: ABNORMAL
WBC # BLD AUTO: 12.1 K/UL (ref 4.8–10.8)

## 2023-11-11 PROCEDURE — A9270 NON-COVERED ITEM OR SERVICE: HCPCS | Performed by: INTERNAL MEDICINE

## 2023-11-11 PROCEDURE — 700111 HCHG RX REV CODE 636 W/ 250 OVERRIDE (IP): Mod: JZ | Performed by: NURSE PRACTITIONER

## 2023-11-11 PROCEDURE — 700105 HCHG RX REV CODE 258: Performed by: NURSE PRACTITIONER

## 2023-11-11 PROCEDURE — 770001 HCHG ROOM/CARE - MED/SURG/GYN PRIV*

## 2023-11-11 PROCEDURE — A9270 NON-COVERED ITEM OR SERVICE: HCPCS | Performed by: NURSE PRACTITIONER

## 2023-11-11 PROCEDURE — 85027 COMPLETE CBC AUTOMATED: CPT

## 2023-11-11 PROCEDURE — 700111 HCHG RX REV CODE 636 W/ 250 OVERRIDE (IP): Performed by: INTERNAL MEDICINE

## 2023-11-11 PROCEDURE — 700102 HCHG RX REV CODE 250 W/ 637 OVERRIDE(OP): Performed by: INTERNAL MEDICINE

## 2023-11-11 PROCEDURE — A9270 NON-COVERED ITEM OR SERVICE: HCPCS | Performed by: STUDENT IN AN ORGANIZED HEALTH CARE EDUCATION/TRAINING PROGRAM

## 2023-11-11 PROCEDURE — 700102 HCHG RX REV CODE 250 W/ 637 OVERRIDE(OP): Performed by: COLON & RECTAL SURGERY

## 2023-11-11 PROCEDURE — 700102 HCHG RX REV CODE 250 W/ 637 OVERRIDE(OP): Performed by: STUDENT IN AN ORGANIZED HEALTH CARE EDUCATION/TRAINING PROGRAM

## 2023-11-11 PROCEDURE — 80053 COMPREHEN METABOLIC PANEL: CPT

## 2023-11-11 PROCEDURE — 700105 HCHG RX REV CODE 258: Performed by: INTERNAL MEDICINE

## 2023-11-11 PROCEDURE — A9270 NON-COVERED ITEM OR SERVICE: HCPCS | Performed by: COLON & RECTAL SURGERY

## 2023-11-11 PROCEDURE — 700102 HCHG RX REV CODE 250 W/ 637 OVERRIDE(OP): Performed by: NURSE PRACTITIONER

## 2023-11-11 PROCEDURE — 36415 COLL VENOUS BLD VENIPUNCTURE: CPT

## 2023-11-11 PROCEDURE — 99223 1ST HOSP IP/OBS HIGH 75: CPT | Performed by: INTERNAL MEDICINE

## 2023-11-11 RX ORDER — OXYCODONE HYDROCHLORIDE 5 MG/1
5 TABLET ORAL EVERY 4 HOURS PRN
Status: DISCONTINUED | OUTPATIENT
Start: 2023-11-11 | End: 2023-11-13 | Stop reason: HOSPADM

## 2023-11-11 RX ORDER — OXYCODONE HYDROCHLORIDE 10 MG/1
10 TABLET ORAL EVERY 4 HOURS PRN
Status: DISCONTINUED | OUTPATIENT
Start: 2023-11-11 | End: 2023-11-13 | Stop reason: HOSPADM

## 2023-11-11 RX ORDER — METRONIDAZOLE 500 MG/1
500 TABLET ORAL EVERY 12 HOURS
Status: DISCONTINUED | OUTPATIENT
Start: 2023-11-11 | End: 2023-11-13 | Stop reason: HOSPADM

## 2023-11-11 RX ADMIN — CEFAZOLIN 2 G: 2 INJECTION, POWDER, FOR SOLUTION INTRAMUSCULAR; INTRAVENOUS at 00:23

## 2023-11-11 RX ADMIN — ACETAMINOPHEN 1000 MG: 500 TABLET, FILM COATED ORAL at 23:28

## 2023-11-11 RX ADMIN — CEFAZOLIN 2 G: 2 INJECTION, POWDER, FOR SOLUTION INTRAMUSCULAR; INTRAVENOUS at 17:40

## 2023-11-11 RX ADMIN — GABAPENTIN 300 MG: 300 CAPSULE ORAL at 05:35

## 2023-11-11 RX ADMIN — Medication 1 APPLICATOR: at 18:00

## 2023-11-11 RX ADMIN — FAMOTIDINE 20 MG: 10 INJECTION, SOLUTION INTRAVENOUS at 05:36

## 2023-11-11 RX ADMIN — FAMOTIDINE 20 MG: 10 INJECTION, SOLUTION INTRAVENOUS at 17:38

## 2023-11-11 RX ADMIN — ENOXAPARIN SODIUM 40 MG: 100 INJECTION SUBCUTANEOUS at 05:35

## 2023-11-11 RX ADMIN — ACETAMINOPHEN 1000 MG: 500 TABLET, FILM COATED ORAL at 17:38

## 2023-11-11 RX ADMIN — METAXALONE 800 MG: 800 TABLET ORAL at 17:38

## 2023-11-11 RX ADMIN — CEFAZOLIN 2 G: 2 INJECTION, POWDER, FOR SOLUTION INTRAMUSCULAR; INTRAVENOUS at 10:38

## 2023-11-11 RX ADMIN — GABAPENTIN 300 MG: 300 CAPSULE ORAL at 17:38

## 2023-11-11 RX ADMIN — METRONIDAZOLE 500 MG: 500 TABLET ORAL at 10:40

## 2023-11-11 RX ADMIN — METRONIDAZOLE 500 MG: 500 TABLET ORAL at 05:35

## 2023-11-11 RX ADMIN — METAXALONE 800 MG: 800 TABLET ORAL at 10:39

## 2023-11-11 RX ADMIN — GABAPENTIN 300 MG: 300 CAPSULE ORAL at 10:39

## 2023-11-11 RX ADMIN — Medication 1 APPLICATOR: at 05:36

## 2023-11-11 RX ADMIN — METRONIDAZOLE 500 MG: 500 TABLET ORAL at 17:38

## 2023-11-11 RX ADMIN — METAXALONE 800 MG: 800 TABLET ORAL at 05:35

## 2023-11-11 RX ADMIN — ACETAMINOPHEN 1000 MG: 500 TABLET, FILM COATED ORAL at 00:21

## 2023-11-11 RX ADMIN — FLUOXETINE HYDROCHLORIDE 40 MG: 20 SOLUTION ORAL at 05:35

## 2023-11-11 RX ADMIN — CEFAZOLIN 2 G: 2 INJECTION, POWDER, FOR SOLUTION INTRAMUSCULAR; INTRAVENOUS at 23:31

## 2023-11-11 RX ADMIN — ACETAMINOPHEN 1000 MG: 500 TABLET, FILM COATED ORAL at 05:35

## 2023-11-11 ASSESSMENT — PAIN DESCRIPTION - PAIN TYPE
TYPE: ACUTE PAIN;SURGICAL PAIN

## 2023-11-11 NOTE — CONSULTS
"INFECTIOUS DISEASES INPATIENT CONSULT NOTE     Date of Service: 11/11/2023    Consult Requested By: Louie Wei M.D.    Reason for Consultation: Infected mesh, intra-abdominal abscess    History of Present Illness:   Niya Stone is a 73 y.o. woman with a history of prior Herve-en-Y gastric bypass and hernioplasty with mesh placement admitted 11/8/2023 for planned surgical intervention.  Extensive review of emergency physician notes, hospital medicine notes and consultant notes performed.  Patient underwent hernioplasty with mesh placement approximately 17 years ago at [facility] by Dr. Manzano.  She developed left-sided abdominal pain approximately 2 months prior to admission which persisted.  Denied any prior fevers or chills, nausea, vomiting, diarrhea or constipation.  Patient had a CT of the abdomen and pelvis without contrast done on 10/12/2023 which revealed mesh repair in the abdominal wall and a 2.2 cm pocket to the left mesh with air tracking along the mesh consistent with mesh infection and possibly early abscess formation.  She is now status post exploratory laparotomy, extensive adhesiolysis, drainage of complex abdominal wall subfascial abscess and excision of infected mesh, resection of segment of small bowel, fistula and chronic abscess wall rind and omentum on 11/8/2023 by Dr. Wei.  Per the procedure note, the mesh was densely adherent and incorporated to the abdominal wall, the mesh was removed and the cavity had aicha pus.  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  Given penicillin allergy, she is on cefazolin and Flagyl.  Infectious disease service consulted for antibiotic recommendations.      All other review of systems reviewed and negative except those documented above in the HPI.     PMH:   Past Medical History:   Diagnosis Date    Anesthesia     \"Hard to get breath when first waking up\"    Arrhythmia     can feel a \"flutter but has been checked out and is " "fine\"    ASTHMA     rare inhaler use    Bronchitis     Hiatus hernia syndrome     Pain     abdomen    Panic attacks     Pneumonia 2003    Psychiatric problem     anxiety panic attacks    Sleep apnea     no longer with weight lose surgery    Unspecified urinary incontinence     wears pads       PSH:  Past Surgical History:   Procedure Laterality Date    CA EXPLORATORY OF ABDOMEN N/A 2023    Procedure: EXPLORATORY LAPAROTOMY, REMOVAL OF INFECTED MESH,  SMALL BOWEL RESECTION, INSICION AND DRAINAGE OF ABSCESS;  Surgeon: Louie Wei M.D.;  Location: SURGERY [facility];  Service: General    INCISION HERNIA REPAIR N/A 2023    Procedure: REPAIR, COMPLEX HERNIA, INCISIONAL;  Surgeon: Louie Wei M.D.;  Location: SURGERY [facility];  Service: General    GASTROSCOPY  2015    Procedure: GASTRIC BYPASS LAPAROSCOPY REVISION ENDOSCOPIC MINA EN Y, GASTRIC OULET REPAIR ;  Surgeon: Louie Wei M.D.;  Location: SURGERY [facility];  Service:     MAMMOPLASTY REDUCTION  2012    Performed by OMAYRA GOYAL at [facility]    LIPOSUCTION  2012    Performed by OMAYRA GOYAL at [facility]    HB COSMETIC ADJUSTMENT      arm skin reduction, back skin removal, s/p gastric bypass    GASTRIC BYPASS LAPAROSCOPIC      CHOLECYSTECTOMY  1993    HYSTERECTOMY, TOTAL ABDOMINAL      CA  DELIVERY ONLY      NERVE ULNAR REPAIR OR EXPLORE      HERNIA REPAIR  [id number]    multiple       FAMILY HX:  Family History   Problem Relation Age of Onset    Hypertension Mother        SOCIAL HX:  Social History     Socioeconomic History    Marital status:      Spouse name: Not on file    Number of children: Not on file    Years of education: Not on file    Highest education level: Not on file   Occupational History    Not on file   Tobacco Use    Smoking status: Former     Current packs/day: 0.00     Average packs/day: 1 pack/day for 1.5 years (1.5 ttl pk-yrs)     " Types: Cigarettes     Start date: 1968     Quit date: 1970     Years since quittin.8    Smokeless tobacco: Never   Vaping Use    Vaping Use: Never used   Substance and Sexual Activity    Alcohol use: No    Drug use: No    Sexual activity: Not on file   Other Topics Concern    Not on file   Social History Narrative    Not on file     Social Determinants of Health     Financial Resource Strain: Not on file   Food Insecurity: Not on file   Transportation Needs: Not on file   Physical Activity: Not on file   Stress: Not on file   Social Connections: Not on file   Intimate Partner Violence: Not on file   Housing Stability: Not on file     Social History     Tobacco Use   Smoking Status Former    Current packs/day: 0.00    Average packs/day: 1 pack/day for 1.5 years (1.5 ttl pk-yrs)    Types: Cigarettes    Start date: 1968    Quit date: 1970    Years since quittin.8   Smokeless Tobacco Never     Social History     Substance and Sexual Activity   Alcohol Use No       Allergies/Intolerances:  Allergies   Allergen Reactions    Iodine Contrast [Diagn[employer]] Swelling    Penicillins Rash     Whole trunk area    Tape Rash     Steri strips - open sores  -  Paper tape & plastic tape OK    Morphine      2003;blue lips    Seasonal        History reviewed with the patient     Other Current Medications:    Current Facility-Administered Medications:     oxyCODONE immediate release (Roxicodone) tablet 10 mg, 10 mg, Oral, Q4HRS PRN, Dony Gela, P.A.-C.    oxyCODONE immediate-release (Roxicodone) tablet 5 mg, 5 mg, Oral, Q4HRS PRN, Dony Gela, P.A.-C.    Nozin nasal  swab, 1 Applicator, Each Nostril, BID, Louie Wei M.D., 1 Applicator at 23 0536    metaxalone (Skelaxin) tablet 800 mg, 800 mg, Oral, TID, Audra Cartagena P.A.-C., 800 mg at 23 0535    albuterol inhaler 2 Puff, 2 Puff, Inhalation, Q6HRS PRN (RT), DWAIN HillPRodgerRRodgerNRodger    FLUoxetine (PROzac) 20 MG/5ML  solution 40 mg, 40 mg, Oral, DAILY, Mera Queen, A.P.R.N., 40 mg at 11/11/23 0535    hydrOXYzine HCl (Atarax) tablet 10 mg, 10 mg, Oral, Q6HRS PRN, Mera Queen, A.P.R.N., 10 mg at 11/10/23 0316    Respiratory Therapy Consult, , Nebulization, Continuous RT, Mera Queen, A.P.R.N.    lactated ringers infusion (BOLUS), 500 mL, Intravenous, Once PRN, Mera Queen, A.P.R.N.    potassium chloride 20 mEq in LR 1,000 mL infusion, , Intravenous, Continuous, Mera Queen, A.P.R.N., Last Rate: 100 mL/hr at 11/10/23 2238, New Bag at 11/10/23 2238    enoxaparin (Lovenox) inj 40 mg, 40 mg, Subcutaneous, DAILY, Mera Queen, A.P.R.N., 40 mg at 11/11/23 0535    Pharmacy Consult Request ...Pain Management Review 1 Each, 1 Each, Other, PHARMACY TO DOSE, Mera Queen, A.P.R.N.    [COMPLETED] acetaminophen (Ofirmev) injection 1,000 mg, 1,000 mg, Intravenous, Q6HRS, 1,000 mg at 11/09/23 0517 **FOLLOWED BY** acetaminophen (Tylenol) tablet 1,000 mg, 1,000 mg, Oral, Q6HR, 1,000 mg at 11/11/23 0535 **FOLLOWED BY** [START ON 11/13/2023] acetaminophen (Tylenol) tablet 1,000 mg, 1,000 mg, Oral, Q6HRS PRN, Mera Queen, A.P.R.N.    ondansetron (Zofran) syringe/vial injection 4 mg, 4 mg, Intravenous, Q4HRS PRN, Mera Queen, A.P.R.N.    promethazine (Phenergan) suppository 25 mg, 25 mg, Rectal, Q4HRS PRN, Mera Queen, A.P.R.N.    diphenhydrAMINE (Benadryl) injection 12.5 mg, 12.5 mg, Intravenous, Q6HRS PRN, Mera Queen, A.P.R.N.    enalaprilat (Vasotec) injection 2.5 mg 2 mL, 2.5 mg, Intravenous, Q6HRS PRN, Mera Queen, A.P.R.N.    famotidine (Pepcid) injection 20 mg, 20 mg, Intravenous, BID, Mera Queen, A.P.R.N., 20 mg at 11/11/23 0536    gabapentin (Neurontin) capsule 300 mg, 300 mg, Oral, TID, Mera Queen, A.P.R.N., 300 mg at 11/11/23 0535    simethicone (Mylicon) chewable tablet 125 mg, 125 mg, Oral, TID PRN, Mera Queen, A.P.R.N.    benzocaine-menthol (Cepacol) lozenge 1 Lozenge, 1 Lozenge, Mouth/Throat, Q4HRS PRN,  "DWAIN HillPRodgerRPRASHANT.    hydrALAZINE (Apresoline) injection 20 mg, 20 mg, Intravenous, Q6HRS PRN, DWAIN HillP.RRodgerN.    ceFAZolin (Ancef) 2 g in  mL IVPB, 2 g, Intravenous, Q8HR, DWAIN HillPRodgerRMONO, Stopped at 23 0053  [unfilled]    Most Recent Vital Signs:  /58   Pulse 91   Temp 36.8 °C (98.2 °F) (Temporal)   Resp 16   Ht 1.676 m (5' 6\")   Wt 73.2 kg (161 lb 6 oz)   SpO2 91%   BMI 26.05 kg/m²   Temp  Av.7 °C (98 °F)  Min: 36.2 °C (97.2 °F)  Max: 37.1 °C (98.8 °F)    Physical Exam:  General: well nourished, no diaphoresis, well-appearing, no acute distress  HEENT: sclera anicteric, PERRL, extraocular muscles intact, mucous membranes moist, oropharynx clear and moist, no oral lesions or exudate  Neck: supple, no lymphadenopathy  Chest: CTAB, no rales, rhonchi or wheezes, normal work of breathing.  Cardiac: regular rate and rhythm, normal S1 S2, no murmurs, rubs or gallops  Abdomen: Wound VAC in mid abdomen, left-sided JAMES drain in place.  Significant tenderness to palpation in the left lower abdomen  Extremities: WWP, no edema, 2+ pedal pulses  Skin: warm and dry, no rashes or worrisome lesions  Neuro: Alert and oriented times 3, non-focal exam, speech fluent, full range of motion to bilateral upper and lower extremities  Psych: normal mood and behavior, pleasant; memory intact, normal judgement    Pertinent Lab Results:  Recent Labs     23  0801 11/10/23  0927 11/11/23  075   WBC 10.5 15.1* 12.1*      Recent Labs     23  0801 11/10/23  0927 11/11/23  075   HEMOGLOBIN 12.0 10.9* 10.3*   HEMATOCRIT 37.3 35.2* 32.9*   MCV 98.9* 100.6* 99.1*   MCH 31.8 31.1 31.0   PLATELETCT 283 270 244         Recent Labs     23  0801 11/10/23  0927 11/11/23  075   SODIUM 136 137 135   POTASSIUM 4.9 4.6 4.1   CHLORIDE 104 103 102   CO2 25 27 28   CREATININE 0.49* 0.54 0.28*        Recent Labs     23  0801 11/10/23  0927 11/11/23  075   ALBUMIN 3.3 3.1* 2.5* "        Pertinent Micro:  Results       Procedure Component Value Units Date/Time    CULTURE WOUND W/ GRAM STAIN [[id number]]  (Abnormal) Collected: 11/08/23 1608    Order Status: Completed Specimen: Wound Updated: 11/10/23 1341     Significant Indicator POS     Source WND     Site Abdominal wall abscess     Culture Result -     Gram Stain Result Rare WBCs.  No organisms seen.       Culture Result Streptococcus anginosus  Moderate growth      Narrative:      Surgery - swabs received    Anaerobic Culture [[id number]] Collected: 11/08/23 1608    Order Status: Completed Specimen: Wound Updated: 11/10/23 1341     Significant Indicator NEG     Source WND     Site Abdominal wall abscess     Culture Result Culture in progress.    Narrative:      Surgery - swabs received    Fungal Culture [[id number]] Collected: 11/08/23 1608    Order Status: Completed Specimen: Wound Updated: 11/10/23 1341     Significant Indicator NEG     Source WND     Site Abdominal wall abscess     Culture Result Culture in progress.     Fungal Smear Results No fungal elements seen.    Narrative:      Surgery - swabs received    CULTURE WOUND W/ GRAM STAIN [[id number]]  (Abnormal) Collected: 11/08/23 1607    Order Status: Completed Specimen: Wound Updated: 11/10/23 1341     Significant Indicator POS     Source WND     Site Abdominal wall abscess     Culture Result -     Gram Stain Result Few WBCs.  No organisms seen.       Culture Result Streptococcus anginosus  Moderate growth      Narrative:      Surgery - swabs received    Anaerobic Culture [[id number]] Collected: 11/08/23 1607    Order Status: Completed Specimen: Wound Updated: 11/10/23 1341     Significant Indicator NEG     Source WND     Site Abdominal wall abscess     Culture Result Culture in progress.    Narrative:      Surgery - swabs received    Fungal Culture [[id number]] Collected: 11/08/23 1607    Order Status: Completed Specimen: Wound Updated: 11/10/23 1341     Significant Indicator NEG      "Source WND     Site Abdominal wall abscess     Culture Result Culture in progress.     Fungal Smear Results No fungal elements seen.    Narrative:      Surgery - swabs received    GRAM STAIN [[id number]] Collected: 11/08/23 1607    Order Status: Completed Specimen: Wound Updated: 11/09/23 0655     Significant Indicator .     Source WND     Site Abdominal wall abscess     Gram Stain Result Few WBCs.  No organisms seen.      Narrative:      Surgery - swabs received    Fungal Smear [[id number]] Collected: 11/08/23 1607    Order Status: Completed Specimen: Wound Updated: 11/09/23 0655     Significant Indicator NEG     Source WND     Site Abdominal wall abscess     Fungal Smear Results No fungal elements seen.    Narrative:      Surgery - swabs received    GRAM STAIN [[id number]] Collected: 11/08/23 1608    Order Status: Completed Specimen: Wound Updated: 11/09/23 0655     Significant Indicator .     Source WND     Site Abdominal wall abscess     Gram Stain Result Rare WBCs.  No organisms seen.      Narrative:      Surgery - swabs received    Fungal Smear [[id number]] Collected: 11/08/23 1608    Order Status: Completed Specimen: Wound Updated: 11/09/23 0655     Significant Indicator NEG     Source WND     Site Abdominal wall abscess     Fungal Smear Results No fungal elements seen.    Narrative:      Surgery - swabs received          No results found for: \"BLOODCULTU\", \"BLDCULT\", \"BCHOLD\"     Studies:  CT-ABDOMEN-PELVIS W/O    Result Date: 10/12/2023  10/12/2023 6:09 PM HISTORY/REASON FOR EXAM:  Abdominal pain, bloating. TECHNIQUE/EXAM DESCRIPTION: CT scan of the abdomen and pelvis without contrast. Noncontrast helical scanning was obtained from the diaphragmatic domes through the pubic symphysis. Low dose optimization technique was utilized for this CT exam including automated exposure control and adjustment of the mA and/or kV according to patient size. COMPARISON: None. FINDINGS: Lower chest: A few areas of linear " atelectasis/scarring in the lower lungs. Liver: No mass. No intrahepatic biliary dilatation. Gallbladder: Cholecystectomy. Common bile duct: Nondilated. Pancreas: Unremarkable. Spleen: No mass. Calcified splenic artery aneurysm. It measures less than 2 cm. Adrenals: No mass. Kidneys: No suspicious mass lesions. No renal stones. No hydronephrosis. Stomach, small bowel, colon: Prior gastric bypass. No bowel wall thickening or obstruction. Colonic diverticulosis. Peritoneal cavity: No ascites. Lymph nodes: No enlarged nodes by size criteria. A few prominent nodes in the small bowel mesentery likely reactive. Aorta: No aneurysm. Atherosclerosis. Pelvic organs: Hysterectomy. Normal bladder. Musculoskeletal structures: No acute fracture or destructive lesion. Abdominal wall: Abdominal wall mesh repair. A 2.2 x 2.2 cm pocket containing debris/air deep to the left mesh aspect, with air tracking along the mesh.     1.  Mesh repair of the abdominal wall. 2.  A 2.2 cm pocket containing debris/air deep to the left mesh, at the level of the umbilicus, with air tracking along the mesh. This is consistent with mesh infection. The pocket likely represents an early abscess. 3.  No fistulous communication between this pocket and adjacent bowel loops. 4.  Colonic diverticulosis.       IMPRESSION:   1.  Infected mesh status post mesh removal on 11/8   2.  Abdominal wall subfascial abscess status post drainage on 11/8  3.  Streptococcus anginosus infection  4.  History of hernia repair with mesh placement  5.  History of Herve-en-Y  6.  Penicillin allergy, tolerates cephalosporins    PLAN:   Niya Stone is a 73 y.o. woman with a history of Herve-en-Y, and hernia repair with mesh placement admitted on 11/8/2023 for planned surgical intervention for infected mesh in the setting of left-sided abdominal pain.  Recent CT scan showed a pocket of fluid deep to the the mesh concerning for an abscess and mesh infection in October.  She is  now status post exploratory laparotomy, extensive adhesiolysis, drainage of complex abdominal wall some facial abscess, excision of infected mesh and resection of segment of small bowel, fistula and chronic abscess wall rind and omentum in the 11/8/2023.  However there is some mesh that remains in place.  Operative cultures are positive for Streptococcus anginosus.    -Continue cefazolin 2 g q 8 hours and Flagyl 500 mg twice daily given penicillin allergy while in the hospital  -Will plan a 6-week course of antibiotics on 11/8 with stop date of 12/20/2023 followed by chronic antibiotic suppression for minimum of 6 months to a year given presence of retained mesh in a infected surgical bed  -At discharge, transition to cefdinir 100 mg every 12 hours plus Flagyl 500 mg twice daily to complete antibiotic course above followed by chronic oral abx suppression with cefinir alone    Follow-up in the ID clinic-okay to schedule with UZMA Verduzco    Plan of care discussed with surgery PA, Dony Gela son at bedside.  ID will sign off    Gracy Delgado M.D.      Please note that this dictation was created using voice recognition software. I have worked with technical experts from [facility] to optimize the interface.  I have made every reasonable attempt to correct obvious errors, but there may be errors of grammar and possibly content that I did not discover before finalizing the note.

## 2023-11-11 NOTE — PROGRESS NOTES
"[facility]  Progress Note      POD #3, s/p Exploratory laparotomy,  Extensive adhesiolysis, Drainage of complex abdominal wall subfascial abscess, Excision of infected mesh,  Resection of segment of small bowel, fistula, and chronic abscess wall rind and omentum     Subjective:     Patient was resting comfortably when I arrived, she notes she feels like she may have overdone it on the PCA and was having some confusion and double vision. She would like to d/c PCA and switch to only oral pain medication. She has been passing a small amount of flatus, no BM yet. Is tolerating clears, will advance to full liquids today. She does not feel comfortable going home yet. Possibly tomorrow. Still waiting on abx recommendations from ID.     Objective:    /58   Pulse 91   Temp 36.8 °C (98.2 °F) (Temporal)   Resp 16   Ht 1.676 m (5' 6\")   Wt 73.2 kg (161 lb 6 oz)   SpO2 91%     I/O last 3 completed shifts:  In: 1340 [P.O.:1340]  Out: 40 [Drains:40]    Current Facility-Administered Medications   Medication Dose    oxyCODONE immediate release (Roxicodone) tablet 10 mg  10 mg    oxyCODONE immediate-release (Roxicodone) tablet 5 mg  5 mg    Nozin nasal  swab  1 Applicator    metaxalone (Skelaxin) tablet 800 mg  800 mg    albuterol inhaler 2 Puff  2 Puff    FLUoxetine (PROzac) 20 MG/5ML solution 40 mg  40 mg    hydrOXYzine HCl (Atarax) tablet 10 mg  10 mg    Respiratory Therapy Consult      lactated ringers infusion (BOLUS)  500 mL    potassium chloride 20 mEq in LR 1,000 mL infusion      enoxaparin (Lovenox) inj 40 mg  40 mg    Pharmacy Consult Request ...Pain Management Review 1 Each  1 Each    acetaminophen (Tylenol) tablet 1,000 mg  1,000 mg    Followed by    [START ON 11/13/2023] acetaminophen (Tylenol) tablet 1,000 mg  1,000 mg    ondansetron (Zofran) syringe/vial injection 4 mg  4 mg    promethazine (Phenergan) suppository 25 mg  25 mg    diphenhydrAMINE (Benadryl) injection 12.5 mg  12.5 mg "    enalaprilat (Vasotec) injection 2.5 mg 2 mL  2.5 mg    famotidine (Pepcid) injection 20 mg  20 mg    gabapentin (Neurontin) capsule 300 mg  300 mg    simethicone (Mylicon) chewable tablet 125 mg  125 mg    benzocaine-menthol (Cepacol) lozenge 1 Lozenge  1 Lozenge    hydrALAZINE (Apresoline) injection 20 mg  20 mg    ceFAZolin (Ancef) 2 g in  mL IVPB  2 g     Physical Exam:       Constitutional:       Appearance: Normal appearance. She is obese.   HENT:      Head: Normocephalic and atraumatic.      Nose: Nose normal.      Mouth/Throat:      Mouth: Mucous membranes are moist.   Eyes:      Conjunctiva/sclera: Conjunctivae normal.   Cardiovascular:      Rate and Rhythm: Normal rate and regular rhythm.   Pulmonary:      Effort: Pulmonary effort is normal. No respiratory distress.   Abdominal:      General: There is distension.      Palpations: Abdomen is soft.      Tenderness: There is abdominal tenderness. There is no guarding or rebound.      Comments: Midline incision with prevena in place, good negative pressure.    JAMES LLQ with serosang output.        Musculoskeletal:         General: Normal range of motion.      Cervical back: Normal range of motion.   Skin:     General: Skin is warm and dry.      Coloration: Skin is not jaundiced.      Findings: No erythema or rash.   Neurological:      Mental Status: She is alert and oriented to person, place, and time.   Psychiatric:         Mood and Affect: Mood normal.    Gen - comfortable, NAD, A&O x 3  HEENT - anicteric, PERRLA  CV - regular rate and rhythm  Abd - soft, + min TTP @ epigastrium and RUQ, no rebound/guarding, no distention, no palp masses or bulges  Inc - all lap incisions are C/D/I - no evidence of infection or bulges; + min bruising at all incisions  Ext - no clubbing, cyanosis or edema bilaterally  Skin - no rashes/lesions, no open wounds, no jaundice    Labs:    Recent Labs     11/09/23  0801 11/10/23  0927 11/11/23  0758   WBC 10.5 15.1* 12.1*    RBC 3.77* 3.50* 3.32*   HEMOGLOBIN 12.0 10.9* 10.3*   HEMATOCRIT 37.3 35.2* 32.9*   MCV 98.9* 100.6* 99.1*   MCH 31.8 31.1 31.0   MCHC 32.2 31.0* 31.3*   RDW 45.0 47.4 46.0   PLATELETCT 283 270 244   MPV 10.4 9.9 10.1     Recent Labs     11/09/23  0801 11/10/23  0927 11/11/23  0758   SODIUM 136 137 135   POTASSIUM 4.9 4.6 4.1   CHLORIDE 104 103 102   CO2 25 27 28   GLUCOSE 159* 119* 93   BUN 11 9 8     Imaging:    No results found.    Assessment/Plan:    - Patient seen and examined this morning, labs reviewed  - Consult to ID for abx recommendations   - Advance to full liquids  - Incision with prevena in place, good negative pressure.    -Monitor JAMES output, currently minimal serosanguinous output.    -d/c PCA, switch to oral pain meds only  - Encourage ambulation as much as possible  - IV abx-  Ancef and Flagyl, cultures preliminary showing Streptococcus anginosus, no fungal elements

## 2023-11-12 LAB
ALBUMIN SERPL BCP-MCNC: 2.9 G/DL (ref 3.2–4.9)
ALBUMIN/GLOB SERPL: 1 G/DL
ALP SERPL-CCNC: 91 U/L (ref 30–99)
ALT SERPL-CCNC: 15 U/L (ref 2–50)
ANION GAP SERPL CALC-SCNC: 7 MMOL/L (ref 7–16)
AST SERPL-CCNC: 41 U/L (ref 12–45)
BACTERIA SPEC ANAEROBE CULT: ABNORMAL
BILIRUB SERPL-MCNC: 0.3 MG/DL (ref 0.1–1.5)
BUN SERPL-MCNC: 5 MG/DL (ref 8–22)
CALCIUM ALBUM COR SERPL-MCNC: 9 MG/DL (ref 8.5–10.5)
CALCIUM SERPL-MCNC: 8.1 MG/DL (ref 8.5–10.5)
CHLORIDE SERPL-SCNC: 105 MMOL/L (ref 96–112)
CO2 SERPL-SCNC: 28 MMOL/L (ref 20–33)
CREAT SERPL-MCNC: 0.32 MG/DL (ref 0.5–1.4)
ERYTHROCYTE [DISTWIDTH] IN BLOOD BY AUTOMATED COUNT: 45.9 FL (ref 35.9–50)
GFR SERPLBLD CREATININE-BSD FMLA CKD-EPI: 109 ML/MIN/1.73 M 2
GLOBULIN SER CALC-MCNC: 2.9 G/DL (ref 1.9–3.5)
GLUCOSE SERPL-MCNC: 101 MG/DL (ref 65–99)
HCT VFR BLD AUTO: 34.9 % (ref 37–47)
HGB BLD-MCNC: 10.9 G/DL (ref 12–16)
MCH RBC QN AUTO: 30.4 PG (ref 27–33)
MCHC RBC AUTO-ENTMCNC: 31.2 G/DL (ref 32.2–35.5)
MCV RBC AUTO: 97.2 FL (ref 81.4–97.8)
PLATELET # BLD AUTO: 283 K/UL (ref 164–446)
PMV BLD AUTO: 10.4 FL (ref 9–12.9)
POTASSIUM SERPL-SCNC: 4.1 MMOL/L (ref 3.6–5.5)
PROT SERPL-MCNC: 5.8 G/DL (ref 6–8.2)
RBC # BLD AUTO: 3.59 M/UL (ref 4.2–5.4)
SIGNIFICANT IND 70042: ABNORMAL
SIGNIFICANT IND 70042: ABNORMAL
SITE SITE: ABNORMAL
SITE SITE: ABNORMAL
SODIUM SERPL-SCNC: 140 MMOL/L (ref 135–145)
SOURCE SOURCE: ABNORMAL
SOURCE SOURCE: ABNORMAL
WBC # BLD AUTO: 11.3 K/UL (ref 4.8–10.8)

## 2023-11-12 PROCEDURE — 80053 COMPREHEN METABOLIC PANEL: CPT

## 2023-11-12 PROCEDURE — 770001 HCHG ROOM/CARE - MED/SURG/GYN PRIV*

## 2023-11-12 PROCEDURE — 700102 HCHG RX REV CODE 250 W/ 637 OVERRIDE(OP): Performed by: STUDENT IN AN ORGANIZED HEALTH CARE EDUCATION/TRAINING PROGRAM

## 2023-11-12 PROCEDURE — 700102 HCHG RX REV CODE 250 W/ 637 OVERRIDE(OP): Performed by: NURSE PRACTITIONER

## 2023-11-12 PROCEDURE — A9270 NON-COVERED ITEM OR SERVICE: HCPCS | Performed by: STUDENT IN AN ORGANIZED HEALTH CARE EDUCATION/TRAINING PROGRAM

## 2023-11-12 PROCEDURE — 700102 HCHG RX REV CODE 250 W/ 637 OVERRIDE(OP): Performed by: INTERNAL MEDICINE

## 2023-11-12 PROCEDURE — A9270 NON-COVERED ITEM OR SERVICE: HCPCS | Performed by: INTERNAL MEDICINE

## 2023-11-12 PROCEDURE — 700105 HCHG RX REV CODE 258: Performed by: NURSE PRACTITIONER

## 2023-11-12 PROCEDURE — 700111 HCHG RX REV CODE 636 W/ 250 OVERRIDE (IP): Performed by: INTERNAL MEDICINE

## 2023-11-12 PROCEDURE — 700105 HCHG RX REV CODE 258: Performed by: INTERNAL MEDICINE

## 2023-11-12 PROCEDURE — 97162 PT EVAL MOD COMPLEX 30 MIN: CPT

## 2023-11-12 PROCEDURE — A9270 NON-COVERED ITEM OR SERVICE: HCPCS | Performed by: NURSE PRACTITIONER

## 2023-11-12 PROCEDURE — 85027 COMPLETE CBC AUTOMATED: CPT

## 2023-11-12 PROCEDURE — 700111 HCHG RX REV CODE 636 W/ 250 OVERRIDE (IP): Mod: JZ | Performed by: NURSE PRACTITIONER

## 2023-11-12 PROCEDURE — 97535 SELF CARE MNGMENT TRAINING: CPT

## 2023-11-12 RX ADMIN — ACETAMINOPHEN 1000 MG: 500 TABLET, FILM COATED ORAL at 11:58

## 2023-11-12 RX ADMIN — CEFAZOLIN 2 G: 2 INJECTION, POWDER, FOR SOLUTION INTRAMUSCULAR; INTRAVENOUS at 23:54

## 2023-11-12 RX ADMIN — CEFAZOLIN 2 G: 2 INJECTION, POWDER, FOR SOLUTION INTRAMUSCULAR; INTRAVENOUS at 16:42

## 2023-11-12 RX ADMIN — ACETAMINOPHEN 1000 MG: 500 TABLET, FILM COATED ORAL at 05:11

## 2023-11-12 RX ADMIN — SODIUM CHLORIDE, POTASSIUM CHLORIDE, SODIUM LACTATE AND CALCIUM CHLORIDE 500 ML: 600; 310; 30; 20 INJECTION, SOLUTION INTRAVENOUS at 02:20

## 2023-11-12 RX ADMIN — GABAPENTIN 300 MG: 300 CAPSULE ORAL at 05:13

## 2023-11-12 RX ADMIN — ENOXAPARIN SODIUM 40 MG: 100 INJECTION SUBCUTANEOUS at 05:12

## 2023-11-12 RX ADMIN — FLUOXETINE HYDROCHLORIDE 40 MG: 20 SOLUTION ORAL at 05:14

## 2023-11-12 RX ADMIN — ACETAMINOPHEN 1000 MG: 500 TABLET, FILM COATED ORAL at 16:44

## 2023-11-12 RX ADMIN — ACETAMINOPHEN 1000 MG: 500 TABLET, FILM COATED ORAL at 23:50

## 2023-11-12 RX ADMIN — METAXALONE 800 MG: 800 TABLET ORAL at 11:57

## 2023-11-12 RX ADMIN — GABAPENTIN 300 MG: 300 CAPSULE ORAL at 16:44

## 2023-11-12 RX ADMIN — CEFAZOLIN 2 G: 2 INJECTION, POWDER, FOR SOLUTION INTRAMUSCULAR; INTRAVENOUS at 08:25

## 2023-11-12 RX ADMIN — METRONIDAZOLE 500 MG: 500 TABLET ORAL at 05:13

## 2023-11-12 RX ADMIN — METRONIDAZOLE 500 MG: 500 TABLET ORAL at 16:44

## 2023-11-12 RX ADMIN — Medication 1 APPLICATOR: at 05:14

## 2023-11-12 RX ADMIN — FAMOTIDINE 20 MG: 10 INJECTION, SOLUTION INTRAVENOUS at 05:13

## 2023-11-12 RX ADMIN — GABAPENTIN 300 MG: 300 CAPSULE ORAL at 11:58

## 2023-11-12 RX ADMIN — METAXALONE 800 MG: 800 TABLET ORAL at 05:14

## 2023-11-12 ASSESSMENT — COGNITIVE AND FUNCTIONAL STATUS - GENERAL
MOVING FROM LYING ON BACK TO SITTING ON SIDE OF FLAT BED: A LITTLE
CLIMB 3 TO 5 STEPS WITH RAILING: A LITTLE
TURNING FROM BACK TO SIDE WHILE IN FLAT BAD: A LITTLE
MOVING TO AND FROM BED TO CHAIR: A LITTLE
WALKING IN HOSPITAL ROOM: A LITTLE
SUGGESTED CMS G CODE MODIFIER MOBILITY: CK
STANDING UP FROM CHAIR USING ARMS: A LITTLE
MOBILITY SCORE: 18

## 2023-11-12 ASSESSMENT — GAIT ASSESSMENTS
DISTANCE (FEET): 200
DEVIATION: BRADYKINETIC
GAIT LEVEL OF ASSIST: SUPERVISED

## 2023-11-12 ASSESSMENT — PAIN DESCRIPTION - PAIN TYPE: TYPE: ACUTE PAIN

## 2023-11-12 NOTE — PROGRESS NOTES
Loss of PIV access 2 RNs attempted but unsuccessful request US placement.  IV fluids on hold, will need for antibiotics and IV fluids.

## 2023-11-12 NOTE — PROGRESS NOTES
Patient is requesting prn bowl protocol when provider is able to order, will d/w on rounds.     Patient reports a BM this morning, so many not need protocol afterall.

## 2023-11-12 NOTE — THERAPY
"Physical Therapy   Initial Evaluation     Patient Name: Niya Stone  Age:  73 y.o., Sex:  female  Medical Record #: [id number]  Today's Date: 11/12/2023     Precautions  Precautions: Fall Risk (abdominal precautions)  Comments: abdominal prevena    Assessment  Patient is 73 y.o. female POD#4 s/p Exploratory laparotomy,  Extensive adhesiolysis, Drainage of complex abdominal wall subfascial abscess, Excision of infected mesh, Resection of segment of small bowel, fistula, and chronic abscess wall rind and omentum. PMHx of complex abdominal wall reconstruction, asthma, former smoker. Pt educated on abdominal precautions with good demo of log roll. Required SPV for hallway ambulation and stairs negotiation with rails. Recommend home with HH, pt with pain during bed mobility and slow/cautious out of bed mobility; assists  at baseline with some help from daughter. Will d/c PT as pt with no further acute IP PT needs.     Plan    Physical Therapy Initial Treatment Plan   Duration: Evaluation only    DC Equipment Recommendations: None (declined need for FWW)  Discharge Recommendations: Recommend home health for continued physical therapy services       Subjective    \"I need to get up and move more.\"      Objective     11/12/23 0844   Vitals   O2 Delivery Device None - Room Air   Pain 0 - 10 Group   Therapist Pain Assessment Nurse Notified;During Activity  (abdominal pain not rated, agreeable to mobility)   Prior Living Situation   Housing / Facility 1 Story House   Steps Into Home 3   Steps In Home 0   Rail Both Rail (Steps into Home)   Bathroom Set up Walk In Shower;Grab Bars   Equipment Owned None   Lives with - Patient's Self Care Capacity Spouse   Comments Assists with  at baseline. Daughter lives near by and can assist intermittently   Prior Level of Functional Mobility   Bed Mobility Independent   Transfer Status Independent   Ambulation Independent   Ambulation Distance   (community)   Assistive " Devices Used None   Stairs Independent   Cognition    Cognition / Consciousness WDL   Level of Consciousness Alert   Comments pleasant and cooperative   Active ROM Upper Body   Active ROM Upper Body  WDL   Strength Upper Body   Upper Body Strength  WDL   Comments with functional mobility   Active ROM Lower Body    Active ROM Lower Body  WDL   Strength Lower Body   Lower Body Strength  WDL   Sensation Lower Body   Lower Extremity Sensation   WDL   Comments denies N/T   Balance Assessment   Sitting Balance (Static) Good   Sitting Balance (Dynamic) Fair +   Standing Balance (Static) Fair +   Standing Balance (Dynamic) Fair   Weight Shift Sitting Fair   Weight Shift Standing Fair   Comments no AD or LOB   Bed Mobility    Supine to Sit Supervised   Sit to Supine Supervised   Scooting Supervised   Rolling Supervised   Comments via log roll   Gait Analysis   Gait Level Of Assist Supervised   Assistive Device None  (initially with FWW first 100ft, able to remove with no LOB and SPV)   Distance (Feet) 200   # of Times Distance was Traveled 1   Deviation Bradykinetic   # of Stairs Climbed 3   Level of Assist with Stairs Supervised   Functional Mobility   Sit to Stand Supervised   Bed, Chair, Wheelchair Transfer Supervised   Transfer Method Stand Step   Mobility hallway, return to supine   How much difficulty does the patient currently have...   Turning over in bed (including adjusting bedclothes, sheets and blankets)? 3   Sitting down on and standing up from a chair with arms (e.g., wheelchair, bedside commode, etc.) 3   Moving from lying on back to sitting on the side of the bed? 3   How much help from another person does the patient currently need...   Moving to and from a bed to a chair (including a wheelchair)? 3   Need to walk in a hospital room? 3   Climbing 3-5 steps with a railing? 3   6 clicks Mobility Score 18   Activity Tolerance   Sitting Edge of Bed < 2 min   Standing 10 min   Comments limited by abdominal pain    Edema / Skin Assessment   Comments abdominal prevena intact pre/post session   Education Group   Education Provided Role of Physical Therapist;Gait Training;Stair Training   Role of Physical Therapist Patient Response Patient;Acceptance;Explanation;Verbal Demonstration   Gait Training Patient Response Patient;Acceptance;Explanation;Action Demonstration   Stair Training Patient Response Patient;Acceptance;Explanation;Action Demonstration   Additional Comments Educated on abdominal precautions, self management and compensatory strategies   Physical Therapy Initial Treatment Plan    Duration Evaluation only   Problem List    Problems Pain;Impaired Bed Mobility;Decreased Activity Tolerance;Impaired Balance   Anticipated Discharge Equipment and Recommendations   DC Equipment Recommendations None  (declined need for FWW)   Discharge Recommendations Recommend home health for continued physical therapy services   Interdisciplinary Plan of Care Collaboration   IDT Collaboration with  Nursing   Patient Position at End of Therapy In Bed;Call Light within Reach;Tray Table within Reach;Phone within Reach  (as found)   Collaboration Comments RN updated   Session Information   Date / Session Number  11/12: eval only, d/c PT

## 2023-11-12 NOTE — PROGRESS NOTES
"[facility]  Progress Note      POD #4, s/p Exploratory laparotomy,  Extensive adhesiolysis, Drainage of complex abdominal wall subfascial abscess, Excision of infected mesh,  Resection of segment of small bowel, fistula, and chronic abscess wall rind and omentum      Subjective:      Patient resting comfortably this morning, reports she did have a BM and is passing small amounts of gas. Is requesting soft foods. Is very concerned her blurry vision has not gone away even after discontinuing PCA.      Objective:    BP 98/62   Pulse 71   Temp 36.8 °C (98.2 °F) (Temporal)   Resp 15   Ht 1.676 m (5' 6\")   Wt 73.2 kg (161 lb 6 oz)   SpO2 95%     I/O last 3 completed shifts:  In: 240 [P.O.:240]  Out: 0     Current Facility-Administered Medications   Medication Dose    Pharmacy Consult Request      oxyCODONE immediate release (Roxicodone) tablet 10 mg  10 mg    oxyCODONE immediate-release (Roxicodone) tablet 5 mg  5 mg    metroNIDAZOLE (Flagyl) tablet 500 mg  500 mg    Nozin nasal  swab  1 Applicator    metaxalone (Skelaxin) tablet 800 mg  800 mg    albuterol inhaler 2 Puff  2 Puff    FLUoxetine (PROzac) 20 MG/5ML solution 40 mg  40 mg    hydrOXYzine HCl (Atarax) tablet 10 mg  10 mg    Respiratory Therapy Consult      potassium chloride 20 mEq in LR 1,000 mL infusion      enoxaparin (Lovenox) inj 40 mg  40 mg    Pharmacy Consult Request ...Pain Management Review 1 Each  1 Each    acetaminophen (Tylenol) tablet 1,000 mg  1,000 mg    Followed by    [START ON 11/13/2023] acetaminophen (Tylenol) tablet 1,000 mg  1,000 mg    ondansetron (Zofran) syringe/vial injection 4 mg  4 mg    promethazine (Phenergan) suppository 25 mg  25 mg    diphenhydrAMINE (Benadryl) injection 12.5 mg  12.5 mg    enalaprilat (Vasotec) injection 2.5 mg 2 mL  2.5 mg    famotidine (Pepcid) injection 20 mg  20 mg    gabapentin (Neurontin) capsule 300 mg  300 mg    simethicone (Mylicon) chewable tablet 125 mg  125 mg    " benzocaine-menthol (Cepacol) lozenge 1 Lozenge  1 Lozenge    hydrALAZINE (Apresoline) injection 20 mg  20 mg    ceFAZolin (Ancef) 2 g in  mL IVPB  2 g     Physical Exam:     Constitutional:       Appearance: Normal appearance. She is obese.   HENT:      Head: Normocephalic and atraumatic.      Nose: Nose normal.      Mouth/Throat:      Mouth: Mucous membranes are moist.   Eyes:      Conjunctiva/sclera: Conjunctivae normal.   Cardiovascular:      Rate and Rhythm: Normal rate and regular rhythm.   Pulmonary:      Effort: Pulmonary effort is normal. No respiratory distress.   Abdominal:      General: There is distension.      Palpations: Abdomen is soft.      Tenderness: There is abdominal tenderness. There is no guarding or rebound.      Comments: Midline incision with prevena in place, good negative pressure.      Musculoskeletal:         General: Normal range of motion.      Cervical back: Normal range of motion.   Skin:     General: Skin is warm and dry.      Coloration: Skin is not jaundiced.      Findings: No erythema or rash.   Neurological:      Mental Status: She is alert and oriented to person, place, and time.   Psychiatric:         Mood and Affect: Mood normal.    Gen - comfortable, NAD, A&O x 3  HEENT - anicteric, PERRLA  CV - regular rate and rhythm  Abd - soft, + min TTP @ epigastrium and RUQ, no rebound/guarding, no distention, no palp masses or bulges  Inc - all lap incisions are C/D/I - no evidence of infection or bulges; + min bruising at all incisions  Ext - no clubbing, cyanosis or edema bilaterally  Skin - no rashes/lesions, no open wounds, no jaundice    Labs:    Recent Labs     11/10/23  0927 11/11/23  0758 11/12/23  0642   WBC 15.1* 12.1* 11.3*   RBC 3.50* 3.32* 3.59*   HEMOGLOBIN 10.9* 10.3* 10.9*   HEMATOCRIT 35.2* 32.9* 34.9*   .6* 99.1* 97.2   MCH 31.1 31.0 30.4   MCHC 31.0* 31.3* 31.2*   RDW 47.4 46.0 45.9   PLATELETCT 270 244 283   MPV 9.9 10.1 10.4     Recent Labs      11/10/23  0927 11/11/23  0758 11/12/23  0642   SODIUM 137 135 140   POTASSIUM 4.6 4.1 4.1   CHLORIDE 103 102 105   CO2 27 28 28   GLUCOSE 119* 93 101*   BUN 9 8 5*     Imaging:    No results found.    Assessment/Plan:    - Patient seen and examined this morning, labs reviewed   - Continue with abx per ID recommendations   - Advance to GI soft diet today   - Consult pharmacy for possible adverse reaction (blurry vision)  - Home tomorrow

## 2023-11-12 NOTE — CARE PLAN
The patient is Stable - Low risk of patient condition declining or worsening    Shift Goals  Clinical Goals: pain control, safety  Patient Goals: pain control  Family Goals: no family present    Progress made toward(s) clinical / shift goals: pain controlled, mobiling well, diet advanced.  MRI ordered for double vision questionnaire completed.     Patient is not progressing towards the following goals:

## 2023-11-12 NOTE — CARE PLAN
The patient is Stable - Low risk of patient condition declining or worsening    Shift Goals  Clinical Goals: safety, pain control  Patient Goals: rest, comfort  Family Goals: no family present    Progress made toward(s) clinical / shift goals:    Problem: Pain - Standard  Goal: Alleviation of pain or a reduction in pain to the patient’s comfort goal  Outcome: Progressing    Administer pain medication per MAR.     Problem: Knowledge Deficit - Standard  Goal: Patient and family/care givers will demonstrate understanding of plan of care, disease process/condition, diagnostic tests and medications  Outcome: Progressing    Educate the patient regarding her plan of care.     Problem: Fall Risk  Goal: Patient will remain free from falls  Outcome: Progressing    Kept side rails up and bed to its lowest position. Kept call bell and patient's belonging within reach. Bed alarm on.        Patient is not progressing towards the following goals:

## 2023-11-12 NOTE — CONSULTS
Consulted for double vision.  Patient states it started after being on dilaudid PCA.  It is improving and horizontal in nature.    MRI brain pending    Full consult to follow after MRI available.  Likely partial 6th nerve palsy vs. Much less likely brainstem stroke.

## 2023-11-13 ENCOUNTER — APPOINTMENT (OUTPATIENT)
Dept: RADIOLOGY | Facility: MEDICAL CENTER | Age: 73
DRG: 907 | End: 2023-11-13
Attending: COLON & RECTAL SURGERY
Payer: COMMERCIAL

## 2023-11-13 VITALS
HEIGHT: 66 IN | SYSTOLIC BLOOD PRESSURE: 128 MMHG | HEART RATE: 70 BPM | OXYGEN SATURATION: 91 % | TEMPERATURE: 98.1 F | BODY MASS INDEX: 25.94 KG/M2 | WEIGHT: 161.38 LBS | DIASTOLIC BLOOD PRESSURE: 69 MMHG | RESPIRATION RATE: 16 BRPM

## 2023-11-13 LAB
ALBUMIN SERPL BCP-MCNC: 2.8 G/DL (ref 3.2–4.9)
ALBUMIN/GLOB SERPL: 0.9 G/DL
ALP SERPL-CCNC: 96 U/L (ref 30–99)
ALT SERPL-CCNC: 30 U/L (ref 2–50)
ANION GAP SERPL CALC-SCNC: 7 MMOL/L (ref 7–16)
AST SERPL-CCNC: 70 U/L (ref 12–45)
BILIRUB SERPL-MCNC: 0.2 MG/DL (ref 0.1–1.5)
BUN SERPL-MCNC: 3 MG/DL (ref 8–22)
CALCIUM ALBUM COR SERPL-MCNC: 9.1 MG/DL (ref 8.5–10.5)
CALCIUM SERPL-MCNC: 8.1 MG/DL (ref 8.5–10.5)
CHLORIDE SERPL-SCNC: 104 MMOL/L (ref 96–112)
CO2 SERPL-SCNC: 29 MMOL/L (ref 20–33)
CREAT SERPL-MCNC: 0.34 MG/DL (ref 0.5–1.4)
ERYTHROCYTE [DISTWIDTH] IN BLOOD BY AUTOMATED COUNT: 46.6 FL (ref 35.9–50)
GFR SERPLBLD CREATININE-BSD FMLA CKD-EPI: 107 ML/MIN/1.73 M 2
GLOBULIN SER CALC-MCNC: 3.1 G/DL (ref 1.9–3.5)
GLUCOSE SERPL-MCNC: 92 MG/DL (ref 65–99)
HCT VFR BLD AUTO: 36.5 % (ref 37–47)
HGB BLD-MCNC: 11.4 G/DL (ref 12–16)
MCH RBC QN AUTO: 30.5 PG (ref 27–33)
MCHC RBC AUTO-ENTMCNC: 31.2 G/DL (ref 32.2–35.5)
MCV RBC AUTO: 97.6 FL (ref 81.4–97.8)
PLATELET # BLD AUTO: 335 K/UL (ref 164–446)
PMV BLD AUTO: 10 FL (ref 9–12.9)
POTASSIUM SERPL-SCNC: 3.9 MMOL/L (ref 3.6–5.5)
PROT SERPL-MCNC: 5.9 G/DL (ref 6–8.2)
RBC # BLD AUTO: 3.74 M/UL (ref 4.2–5.4)
SODIUM SERPL-SCNC: 140 MMOL/L (ref 135–145)
WBC # BLD AUTO: 8.8 K/UL (ref 4.8–10.8)

## 2023-11-13 PROCEDURE — 700105 HCHG RX REV CODE 258: Performed by: INTERNAL MEDICINE

## 2023-11-13 PROCEDURE — 700111 HCHG RX REV CODE 636 W/ 250 OVERRIDE (IP): Mod: JZ | Performed by: NURSE PRACTITIONER

## 2023-11-13 PROCEDURE — 700111 HCHG RX REV CODE 636 W/ 250 OVERRIDE (IP): Performed by: INTERNAL MEDICINE

## 2023-11-13 PROCEDURE — 700102 HCHG RX REV CODE 250 W/ 637 OVERRIDE(OP): Performed by: INTERNAL MEDICINE

## 2023-11-13 PROCEDURE — 700102 HCHG RX REV CODE 250 W/ 637 OVERRIDE(OP): Performed by: STUDENT IN AN ORGANIZED HEALTH CARE EDUCATION/TRAINING PROGRAM

## 2023-11-13 PROCEDURE — A9270 NON-COVERED ITEM OR SERVICE: HCPCS | Performed by: STUDENT IN AN ORGANIZED HEALTH CARE EDUCATION/TRAINING PROGRAM

## 2023-11-13 PROCEDURE — 80053 COMPREHEN METABOLIC PANEL: CPT

## 2023-11-13 PROCEDURE — A9270 NON-COVERED ITEM OR SERVICE: HCPCS | Performed by: INTERNAL MEDICINE

## 2023-11-13 PROCEDURE — 85027 COMPLETE CBC AUTOMATED: CPT

## 2023-11-13 PROCEDURE — A9270 NON-COVERED ITEM OR SERVICE: HCPCS | Performed by: NURSE PRACTITIONER

## 2023-11-13 PROCEDURE — 700102 HCHG RX REV CODE 250 W/ 637 OVERRIDE(OP): Performed by: NURSE PRACTITIONER

## 2023-11-13 RX ORDER — METRONIDAZOLE 500 MG/1
500 TABLET ORAL 2 TIMES DAILY
Qty: 84 TABLET | Refills: 0 | Status: ACTIVE | OUTPATIENT
Start: 2023-11-13 | End: 2023-12-25

## 2023-11-13 RX ORDER — CEFDINIR 300 MG/1
300 CAPSULE ORAL 2 TIMES DAILY
Qty: 84 CAPSULE | Refills: 0 | Status: ACTIVE | OUTPATIENT
Start: 2023-11-13 | End: 2023-12-25

## 2023-11-13 RX ADMIN — METRONIDAZOLE 500 MG: 500 TABLET ORAL at 05:26

## 2023-11-13 RX ADMIN — FLUOXETINE HYDROCHLORIDE 40 MG: 20 SOLUTION ORAL at 05:28

## 2023-11-13 RX ADMIN — ENOXAPARIN SODIUM 40 MG: 100 INJECTION SUBCUTANEOUS at 05:27

## 2023-11-13 RX ADMIN — ACETAMINOPHEN 1000 MG: 500 TABLET, FILM COATED ORAL at 05:26

## 2023-11-13 RX ADMIN — GABAPENTIN 300 MG: 300 CAPSULE ORAL at 05:27

## 2023-11-13 RX ADMIN — METAXALONE 800 MG: 800 TABLET ORAL at 05:26

## 2023-11-13 RX ADMIN — Medication 1 APPLICATOR: at 05:28

## 2023-11-13 RX ADMIN — CEFAZOLIN 2 G: 2 INJECTION, POWDER, FOR SOLUTION INTRAMUSCULAR; INTRAVENOUS at 08:05

## 2023-11-13 RX ADMIN — FAMOTIDINE 20 MG: 10 INJECTION, SOLUTION INTRAVENOUS at 05:28

## 2023-11-13 ASSESSMENT — PAIN DESCRIPTION - PAIN TYPE: TYPE: ACUTE PAIN

## 2023-11-13 NOTE — CARE PLAN
Problem: Pain - Standard  Goal: Alleviation of pain or a reduction in pain to the patient’s comfort goal  Outcome: Progressing     Problem: Knowledge Deficit - Standard  Goal: Patient and family/care givers will demonstrate understanding of plan of care, disease process/condition, diagnostic tests and medications  Outcome: Progressing     Problem: Fall Risk  Goal: Patient will remain free from falls  Outcome: Progressing   The patient is Stable - Low risk of patient condition declining or worsening    Shift Goals  Clinical Goals: Discharge  Patient Goals: Discharge  Family Goals: Discharge    Progress made toward(s) clinical / shift goals:      Patient is not progressing towards the following goals:

## 2023-11-13 NOTE — DISCHARGE INSTRUCTIONS
Discharge instructions:    1. DIET: Upon discharge from the hospital you may resume your normal preoperative diet. Depending on how you are feeling and whether you have nausea or not, you may wish to stay with a bland diet for the first few days. However, you can advance this as quickly as you feel ready.    2. ACTIVITIES: After discharge from the hospital, you may resume full routine activities. However, there should be no heavy lifting (greater than 20 pounds) and no strenuous activities until after your follow-up visit. Otherwise, routine activities of daily living are acceptable.    3. DRIVING: You may drive whenever you are off pain medications and are able to perform the activities needed to drive, i.e. turning, bending, twisting, etc.    4. BATHING: You may get the wound wet 2 days after surgery. You may shower, but do not submerge in a bath for at least a week. Dressings may come off after 48 hours. You have steri-strips under your dressings. These steri-strips should stay in place. They will fall off over 5-7 days.     5. BOWEL FUNCTION: Constipation is common after an operation, especially with pain medications. The combination of pain medication and decreased activity level can cause constipation in otherwise normal patients. If you feel this is occurring, take a laxative (Miralax, Milk of Magnesia, Ex-Lax, Senokot, etc.) until the problem has resolved.    6. PAIN MEDICATION: You will be given a prescription for pain medication at discharge. Please take these as directed. It is important to remember not to take medications on an empty stomach as this may cause nausea.    7.CALL IF YOU HAVE: (1) Fevers to more than 101.0 F, (2) Unusual chest or leg pain, (3) Drainage or fluid from incision that may be foul smelling, increased tenderness or soreness at the wound or the wound edges are no longer together, redness or swelling at the incision site. Please do not hesitate to call with any other questions.      8. APPOINTMENT: Contact our office at [phone] for a follow-up appointment in 1 weeks following your procedure.    If you have any additional questions, please do not hesitate to call the office and speak to either myself or the physician on call.    Office address:  [facility]  [address] [place] [address]  [address]  [place] [address]       Exploratory Laparotomy, Adult, Care After  The following information offers guidance on how to care for yourself after your procedure. Your health care provider may also give you more specific instructions. If you have problems or questions, contact your health care provider.  What can I expect after the procedure?  After the procedure, it is common to have:  Abdominal soreness.  Fatigue.  Bloating.  Gas.  A sore throat from having had a breathing or draining tube in your throat.  A lack of appetite.  Follow these instructions at home:  Medicines  Take over-the-counter and prescription medicines only as told by your health care provider.  If you were prescribed an antibiotic medicine, take it as told by your health care provider. Do not stop taking the antibiotic even if you start to feel better.  Ask your health care provider if the medicine prescribed to you:  Requires you to avoid driving or using machinery.  Can cause constipation. You may need to take these actions to prevent or treat constipation:  Drink enough fluid to keep your urine pale yellow.  Take over-the-counter or prescription medicines. Undergoing surgery and taking pain medicines can make constipation worse.  Eat foods that are high in fiber, such as beans, whole grains, and fresh fruits and vegetables.  Limit foods that are high in fat and processed sugars, such as fried or sweet foods.  Incision care    Follow instructions from your health care provider about how to take care of your incision. Make sure you:  Wash your hands with soap and water for at least 20 seconds before and after you change your  bandage (dressing). If soap and water are not available, use hand .  Change your dressing as told by your health care provider.  Leave stitches (sutures), skin glue, or adhesive strips in place. These skin closures may need to stay in place for 2 weeks or longer. If adhesive strip edges start to loosen and curl up, you may trim the loose edges. Do not remove adhesive strips completely unless your health care provider tells you to do that.  If you were sent home with a drain, follow instructions from your health care provider about how to care for it.  Check your incision area every day for signs of infection. Check for:  Redness, swelling, or pain.  Fluid or blood.  Warmth.  Pus or a bad smell.  Activity    Rest as told by your health care provider.  Avoid sitting for a long time without moving. Get up to take short walks every 1-2 hours. This is important to improve blood flow and breathing. Ask for help if you feel weak or unsteady.  Do not lift anything that is heavier than 5 lb (2.3 kg), or the limit that you are told, until your health care provider says that it is safe.  Return to your normal activities as told by your health care provider. Ask your health care provider what activities are safe for you.  Bathing  Keep your incision clean and dry. Clean it as often as told by your health care provider. You may be told to:  Gently wash the incision with soap and water.  Rinse the incision with water to remove all soap.  Pat the incision dry with a clean towel. Do not rub the incision.  General instructions  Do not use any products that contain nicotine or tobacco. These products include cigarettes, chewing tobacco, and vaping devices, such as e-cigarettes. These can delay incision healing after surgery. If you need help quitting, ask your health care provider.  Wear compression stockings as told by your health care provider. These stockings help to prevent blood clots and reduce swelling in your  legs.  Keep all follow-up visits. This is important.  Contact a health care provider if:  You have a fever or chills.  Your pain medicine is not helping.  You have constipation or diarrhea.  You have nausea or vomiting.  You have drainage, redness, swelling, or pain at your incision site.  Get help right away if:  Your pain is getting worse.  You have not had a bowel movement for more than 3 days.  You have ongoing (persistent) vomiting.  The edges of your incision open up.  You have warmth, tenderness, or swelling in your calf.  You have trouble breathing.  You have chest pain.  These symptoms may represent a serious problem that is an emergency. Do not wait to see if the symptoms will go away. Get medical help right away. Call your local emergency services (911 in the [place] Do not drive yourself to the hospital.  Summary  Abdominal soreness is common after exploratory laparotomy. Take over-the-counter and prescription medicines only as told by your health care provider.  Follow instructions from your health care provider about how to take care of your incision.  Do not lift anything that is heavier than 5 lb (2.3 kg), or the limit that you are told, until your health care provider says that it is safe.  This information is not intended to replace advice given to you by your health care provider. Make sure you discuss any questions you have with your health care provider.  Document Revised: 08/31/2021 Document Reviewed: 08/31/2021  Elsevier Patient Education © 2023 [employer]

## 2023-11-13 NOTE — CARE PLAN
The patient is Stable - Low risk of patient condition declining or worsening    Shift Goals  Clinical Goals: safety, pain control  Patient Goals: rest and comfort  Family Goals: no family present    Progress made toward(s) clinical / shift goals:    Problem: Pain - Standard  Goal: Alleviation of pain or a reduction in pain to the patient’s comfort goal  Outcome: Progressing    Administer pain medication per MAR.     Problem: Knowledge Deficit - Standard  Goal: Patient and family/care givers will demonstrate understanding of plan of care, disease process/condition, diagnostic tests and medications  Outcome: Progressing    Educate the patient regarding her plan of care and encourage her to actively [participate.     Problem: Fall Risk  Goal: Patient will remain free from falls  Outcome: Progressing    Kept side rails up and bed to its lowest position. Kept call bell and patient's belonging within reach.        Patient is not progressing towards the following goals:

## 2023-11-13 NOTE — DISCHARGE SUMMARY
Discharge Summary    CHIEF COMPLAINT ON ADMISSION  No chief complaint on file.      Reason for Admission  Morbid obesity (HCC)     Admission Date  11/8/2023    CODE STATUS  Full Code    HPI & HOSPITAL COURSE  This is a 73 y.o. female here with s/p Exploratory laparotomy,  Extensive adhesiolysis, Drainage of complex abdominal wall subfascial abscess, Excision of infected mesh,  Resection of segment of small bowel, fistula, and chronic abscess wall rind and omentum       No notes on file    Therefore, she is discharged in good and stable condition to home with close outpatient follow-up.    The patient met 2-midnight criteria for an inpatient stay at the time of discharge.    Discharge Date  11/13/2023    FOLLOW UP ITEMS POST DISCHARGE  Follow up in office for staple removal and postop check in 5-7 days    DISCHARGE DIAGNOSES  Active Problems:    * No active hospital problems. *  Resolved Problems:    * No resolved hospital problems. *      FOLLOW UP  No future appointments.  Dony Ren P.A.-C.  [address] [id number] [phone]    Schedule an appointment as soon as possible for a visit in 1 week(s)        MEDICATIONS ON DISCHARGE     Medication List        CONTINUE taking these medications        Instructions   albuterol 108 (90 Base) MCG/ACT Aers inhalation aerosol   Inhale 2 Puffs by mouth every 6 hours as needed for Shortness of Breath.  Dose: 2 Puff     APPLE CIDER VINEGAR PO   Take 1 Tablet by mouth every day.  Dose: 1 Tablet     ciprofloxacin 500 MG Tabs  Commonly known as: Cipro   Take 500 mg by mouth 2 times a day. X 14 days  Dose: 500 mg     fluoxetine 40 MG capsule  Commonly known as: PROzac   Take 40 mg by mouth every day.  Dose: 40 mg     hydrOXYzine HCl 10 MG Tabs  Commonly known as: Atarax   Take 10 mg by mouth every 6 hours as needed for Anxiety.  Dose: 10 mg     montelukast 10 MG Tabs  Commonly known as: Singulair   Take 10 mg by mouth at bedtime.  Dose: 10 mg     NON  SPECIFIED   1 Each every 3 months. Unknown Hormone tablet that is inserted under skin by physician  Dose: 1 Each     VITAMIN B-12 INJ   Inject 1 Each as directed every 30 (thirty) days.  Dose: 1 Each              Allergies  Allergies   Allergen Reactions    Iodine Contrast [Diagnostic X-[employer]] Swelling    Penicillins Rash     Whole trunk area    Tape Rash     Steri strips - open sores  -  Paper tape & plastic tape OK    Morphine      2003-09-22;blue lips    Seasonal        DIET  Orders Placed This Encounter   Procedures    Diet Order Diet: Low Fiber(GI Soft); Nutrient modifications: (optional): High Protein     Standing Status:   Standing     Number of Occurrences:   1     Order Specific Question:   Diet:     Answer:   Low Fiber(GI Soft) [2]     Order Specific Question:   Nutrient modifications: (optional)     Answer:   High Protein [4]       ACTIVITY  As tolerated.  20-lb lifting restriction    CONSULTATIONS  Neurology     PROCEDURES  None    LABORATORY  Lab Results   Component Value Date    SODIUM 140 11/13/2023    POTASSIUM 3.9 11/13/2023    CHLORIDE 104 11/13/2023    CO2 29 11/13/2023    GLUCOSE 92 11/13/2023    BUN 3 (L) 11/13/2023    CREATININE 0.34 (L) 11/13/2023    GLOMRATE 83 09/20/2023        Lab Results   Component Value Date    WBC 8.8 11/13/2023    HEMOGLOBIN 11.4 (L) 11/13/2023    HEMATOCRIT 36.5 (L) 11/13/2023    PLATELETCT 335 11/13/2023        Total time of the discharge process exceeds 30 minutes.

## 2023-12-04 LAB
FUNGUS SPEC CULT: NORMAL
FUNGUS SPEC CULT: NORMAL
FUNGUS SPEC FUNGUS STN: NORMAL
FUNGUS SPEC FUNGUS STN: NORMAL
SIGNIFICANT IND 70042: NORMAL
SIGNIFICANT IND 70042: NORMAL
SITE SITE: NORMAL
SITE SITE: NORMAL
SOURCE SOURCE: NORMAL
SOURCE SOURCE: NORMAL

## 2023-12-11 NOTE — PROGRESS NOTES
"Infectious Disease Follow up Note      Subjective:     Chief Complaint   Patient presents with    Hospital Follow-up     intra-abdominal abscess         Interval History:   73 y.o. woman with a history of Herve-en-Y, and hernia repair with mesh placement admitted on 11/8/2023 for planned surgical intervention for infected mesh in the setting of left-sided abdominal pain.  Recent CT scan showed a pocket of fluid deep to the the mesh concerning for an abscess and mesh infection in October.  She is now status post exploratory laparotomy, extensive adhesiolysis, drainage of complex abdominal wall some facial abscess, excision of infected mesh and resection of segment of small bowel, fistula and chronic abscess wall rind and omentum in the 11/8/2023.  However there is some mesh that remains in place on the right side.  Operative cultures are positive for Streptococcus anginosus.     Plan a 6-week course of antibiotics with cefdinir 300 mg every 12 hours plus Flagyl 500 mg twice a day from 11/8 with stop date of 12/20/2023 followed by chronic antibiotic suppression for minimum of 6 months to a year given presence of retained mesh in a infected surgical bed     Hospital records reviewed     Patient here for hospital follow-up.  She is doing better since hospital discharge.  She continues to be on cefdinir and Flagyl but states that she has some soft stools but not watery.  She has some nausea but she states the nausea is tolerable.  In addition, she states that she has been experiencing night sweats but they are overall improving.  She saw Dr. Wei in the office this morning for follow-up.  She denies any fevers or chills.  No abdominal pain.      ROS    Past Medical History:   Diagnosis Date    Anesthesia     \"Hard to get breath when first waking up\"    Arrhythmia     can feel a \"flutter but has been checked out and is fine\"    ASTHMA     rare inhaler use    Bronchitis     Hiatus hernia syndrome     Pain     abdomen    " Panic attacks     Pneumonia 2003    Psychiatric problem     anxiety panic attacks    Sleep apnea     no longer with weight lose surgery    Unspecified urinary incontinence     wears pads       Past Surgical History:   Procedure Laterality Date    MN EXPLORATORY OF ABDOMEN N/A 2023    Procedure: EXPLORATORY LAPAROTOMY, REMOVAL OF INFECTED MESH,  SMALL BOWEL RESECTION, INSICION AND DRAINAGE OF ABSCESS;  Surgeon: Louie Wei M.D.;  Location: [facility];  Service: General    INCISION HERNIA REPAIR N/A 2023    Procedure: REPAIR, COMPLEX HERNIA, INCISIONAL;  Surgeon: Louie Wei M.D.;  Location: [facility];  Service: General    GASTROSCOPY  2015    Procedure: GASTRIC BYPASS LAPAROSCOPY REVISION ENDOSCOPIC MINA EN Y, GASTRIC OULET REPAIR ;  Surgeon: Louie Wei M.D.;  Location: [facility];  Service:     MAMMOPLASTY REDUCTION  2012    Performed by OMAYRA GOYAL at [facility]    LIPOSUCTION  2012    Performed by OMAYRA GOYAL at [facility]    HB COSMETIC ADJUSTMENT      arm skin reduction, back skin removal, s/p gastric bypass    GASTRIC BYPASS LAPAROSCOPIC      CHOLECYSTECTOMY      HYSTERECTOMY, TOTAL ABDOMINAL      [place]  DELIVERY ONLY      NERVE ULNAR REPAIR OR EXPLORE      HERNIA REPAIR  [id number]    multiple       Allergies:   Allergies   Allergen Reactions    Iodine Contrast [Diagnostic X-[employer] Materials] Swelling    Penicillins Rash     Whole trunk area    Tape Rash     Steri strips - open sores  -  Paper tape & plastic tape OK    Morphine      2003;blue lips    Seasonal          Medications:  Current Outpatient Medications on File Prior to Visit   Medication Sig Dispense Refill    cefdinir (OMNICEF) 300 MG Cap Take 1 Capsule by mouth 2 times a day for 42 days. 84 Capsule 0    metroNIDAZOLE (FLAGYL) 500 MG Tab Take 1 Tablet by mouth 2 times a day for 42 days. 84 Tablet 0    ciprofloxacin  "(CIPRO) 500 MG Tab Take 500 mg by mouth 2 times a day. X 14 days      fluoxetine (PROZAC) 40 MG capsule Take 40 mg by mouth every day.      hydrOXYzine HCl (ATARAX) 10 MG Tab Take 10 mg by mouth every 6 hours as needed for Anxiety.      APPLE CIDER VINEGAR PO Take 1 Tablet by mouth every day.      montelukast (SINGULAIR) 10 MG Tab Take 10 mg by mouth at bedtime.      Cyanocobalamin (VITAMIN B-12 INJ) Inject 1 Each as directed every 30 (thirty) days.      NON SPECIFIED 1 Each every 3 months. Unknown Hormone tablet that is inserted under skin by physician      albuterol 108 (90 BASE) MCG/ACT Aero Soln inhalation aerosol Inhale 2 Puffs by mouth every 6 hours as needed for Shortness of Breath. 8.5 g 3     No current facility-administered medications on file prior to visit.         ROS  As documented above in my HPI       Objective:     PE:  BP 94/60 (BP Location: Left arm, Patient Position: Sitting, BP Cuff Size: Adult)   Pulse 81   Temp 36.7 °C (98.1 °F) (Temporal)   Resp 16   Ht 1.676 m (5' 6\")   Wt 72.8 kg (160 lb 7.9 oz)   SpO2 95%   BMI 25.90 kg/m²      Vital signs reviewed  Constitutional: patient is oriented to person, place, and time. Appears well-developed and well-nourished. No distress  Eyes: Conjunctivae normal and EOM are normal. Pupils are equal, round, and reactive to light.   Mouth/Throat: Lips without lesions, good dentition, oropharynx is clear and moist.  Neck: Trachea midline. Normal range of motion. Neck supple. No masses  Cardiovascular: Normal rate, regular rhythm, normal heart sounds and intact distal pulses. No murmur, gallop, or friction rub. No edema.  Pulmonary/Chest: No respiratory distress. Unlabored respiratory effort, lungs clear to auscultation. No wheezes or rales.   Abdominal: Soft, non tender. BS + x 4. No masses or hepatosplenomegaly.  Midline abdominal surgical scar  Musculoskeletal: Normal range of motion. No tenderness, swelling, erythema, deformity noted.  Neurological: " alert and oriented to person, place, and time. No cranial nerve deficit. Coordination normal. Moves all extremities  Skin: Skin is warm and dry. Good turgor. No rashes visable.  Psychiatric: Normal mood and affect. Behavior is normal.  Pleasant    LABS:  WBC   Date/Time Value Ref Range Status   11/13/2023 04:44 AM 8.8 4.8 - 10.8 K/uL Final     RBC   Date/Time Value Ref Range Status   11/13/2023 04:44 AM 3.74 (L) 4.20 - 5.40 M/uL Final     Hemoglobin   Date/Time Value Ref Range Status   11/13/2023 04:44 AM 11.4 (L) 12.0 - 16.0 g/dL Final     Hematocrit   Date/Time Value Ref Range Status   11/13/2023 04:44 AM 36.5 (L) 37.0 - 47.0 % Final     MCV   Date/Time Value Ref Range Status   11/13/2023 04:44 AM 97.6 81.4 - 97.8 fL Final     MCH   Date/Time Value Ref Range Status   11/13/2023 04:44 AM 30.5 27.0 - 33.0 pg Final     MCHC   Date/Time Value Ref Range Status   11/13/2023 04:44 AM 31.2 (L) 32.2 - 35.5 g/dL Final     Comment:     Please note new reference range effective 05/22/2023.     MPV   Date/Time Value Ref Range Status   11/13/2023 04:44 AM 10.0 9.0 - 12.9 fL Final        Sodium   Date/Time Value Ref Range Status   11/13/2023 04:44  135 - 145 mmol/L Final     Potassium   Date/Time Value Ref Range Status   11/13/2023 04:44 AM 3.9 3.6 - 5.5 mmol/L Final     Chloride   Date/Time Value Ref Range Status   11/13/2023 04:44  96 - 112 mmol/L Final     Co2   Date/Time Value Ref Range Status   11/13/2023 04:44 AM 29 20 - 33 mmol/L Final     Glucose   Date/Time Value Ref Range Status   11/13/2023 04:44 AM 92 65 - 99 mg/dL Final     Bun   Date/Time Value Ref Range Status   11/13/2023 04:44 AM 3 (L) 8 - 22 mg/dL Final     Creatinine   Date/Time Value Ref Range Status   11/13/2023 04:44 AM 0.34 (L) 0.50 - 1.40 mg/dL Final     Glom Filt Rate, Est   Date/Time Value Ref Range Status   09/20/2023 11:54 AM 83 >60 mL/min/1.7 Final     Comment:     Estimated GFR derived from the MDRD Study equation can be used in patients  "who are in the hospital.  However, it is important to pay attention to potential inaccuracies due to the non-steady state of serum creatinine, co-morbidities that cause malnutrition, and the use of medications that interfere with the measurement of serum creatinine.    The estimated GFR is only accurate for patients greater than 18 years of age.     Alkaline Phosphatase   Date/Time Value Ref Range Status   11/13/2023 04:44 AM 96 30 - 99 U/L Final     AST(SGOT)   Date/Time Value Ref Range Status   11/13/2023 04:44 AM 70 (H) 12 - 45 U/L Final     ALT(SGPT)   Date/Time Value Ref Range Status   11/13/2023 04:44 AM 30 2 - 50 U/L Final     Total Bilirubin   Date/Time Value Ref Range Status   11/13/2023 04:44 AM 0.2 0.1 - 1.5 mg/dL Final        No results found for: \"CPKTOTAL\"     MICRO:  No results found for: \"BLOODCULTU\", \"BLDCULT\", \"BCHOLD\"       IMAGING STUDIES:  none    Assessment/Plan:     Problem List Items Addressed This Visit    None  Visit Diagnoses       Abdominal wall abscess        Infected hernioplasty mesh, subsequent encounter        Streptococcal infection              Patient appears to be doing relatively well on antibiotics.     -Continue oral cefdinir 300 mg twice a day and Flagyl twice a day through 12/20/2023.  Thereafter, continue oral cefdinir 300 mg twice a day for chronic antibiotic suppression for a minimum of 6 months to possibly a year given presence of retained mesh in an infected surgical bed if she tolerates.  The infection was primarily on the left side and she had extensive infected mesh removed however, there is intact mesh that remains in a potentially infected surgical bed.    Follow up: In April 2024 after she returns from [facility] sooner if needed. FU with PCP for ongoing chronic medical conditions.     Gracy Delgado M.D.      Please note that this dictation was created using voice recognition software. I have worked with technical experts from [facility] to " optimize the interface.  I have made every reasonable attempt to correct obvious errors, but there may be errors of grammar and possibly content that I did not discover before finalizing the note.

## 2023-12-14 ENCOUNTER — OFFICE VISIT (OUTPATIENT)
Dept: INFECTIOUS DISEASES | Facility: MEDICAL CENTER | Age: 73
End: 2023-12-14
Attending: INTERNAL MEDICINE
Payer: COMMERCIAL

## 2023-12-14 VITALS
OXYGEN SATURATION: 95 % | SYSTOLIC BLOOD PRESSURE: 94 MMHG | HEIGHT: 66 IN | RESPIRATION RATE: 16 BRPM | WEIGHT: 160.5 LBS | TEMPERATURE: 98.1 F | DIASTOLIC BLOOD PRESSURE: 60 MMHG | HEART RATE: 81 BPM | BODY MASS INDEX: 25.79 KG/M2

## 2023-12-14 DIAGNOSIS — T85.79XD INFECTED HERNIOPLASTY MESH, SUBSEQUENT ENCOUNTER: ICD-10-CM

## 2023-12-14 DIAGNOSIS — A49.1 STREPTOCOCCAL INFECTION: ICD-10-CM

## 2023-12-14 DIAGNOSIS — L02.211 ABDOMINAL WALL ABSCESS: ICD-10-CM

## 2023-12-14 PROCEDURE — 99214 OFFICE O/P EST MOD 30 MIN: CPT | Performed by: INTERNAL MEDICINE

## 2023-12-14 PROCEDURE — 3074F SYST BP LT 130 MM HG: CPT | Performed by: INTERNAL MEDICINE

## 2023-12-14 PROCEDURE — 3078F DIAST BP <80 MM HG: CPT | Performed by: INTERNAL MEDICINE

## 2023-12-14 PROCEDURE — 99212 OFFICE O/P EST SF 10 MIN: CPT | Performed by: INTERNAL MEDICINE

## 2023-12-14 RX ORDER — SULFAMETHOXAZOLE
POWDER (GRAM) MISCELLANEOUS
COMMUNITY
End: 2023-12-14

## 2023-12-14 RX ORDER — CIPROFLOXACIN 500 MG/1
TABLET, FILM COATED ORAL
COMMUNITY
Start: 2023-11-02 | End: 2023-12-14

## 2023-12-14 RX ORDER — CEFDINIR 300 MG/1
300 CAPSULE ORAL 2 TIMES DAILY
Qty: 180 CAPSULE | Refills: 1 | Status: SHIPPED | OUTPATIENT
Start: 2023-12-14 | End: 2024-06-11

## 2023-12-14 RX ORDER — POLYETHYLENE GLYCOL 3350 17 G/17G
POWDER, FOR SOLUTION ORAL
COMMUNITY
Start: 2023-11-02 | End: 2023-12-14

## 2023-12-14 ASSESSMENT — FIBROSIS 4 INDEX: FIB4 SCORE: 2.82

## 2024-03-29 ENCOUNTER — TELEPHONE (OUTPATIENT)
Dept: INFECTIOUS DISEASES | Facility: MEDICAL CENTER | Age: 74
End: 2024-03-29
Payer: COMMERCIAL

## 2024-06-11 ENCOUNTER — OFFICE VISIT (OUTPATIENT)
Dept: INFECTIOUS DISEASES | Facility: MEDICAL CENTER | Age: 74
End: 2024-06-11
Attending: INTERNAL MEDICINE
Payer: COMMERCIAL

## 2024-06-11 VITALS
HEIGHT: 66 IN | OXYGEN SATURATION: 96 % | SYSTOLIC BLOOD PRESSURE: 102 MMHG | DIASTOLIC BLOOD PRESSURE: 60 MMHG | TEMPERATURE: 98 F | RESPIRATION RATE: 16 BRPM | HEART RATE: 80 BPM | WEIGHT: 166 LBS | BODY MASS INDEX: 26.68 KG/M2

## 2024-06-11 DIAGNOSIS — T85.79XD INFECTED PROSTHETIC MESH OF ABDOMINAL WALL, SUBSEQUENT ENCOUNTER: ICD-10-CM

## 2024-06-11 DIAGNOSIS — T85.79XD INFECTED HERNIOPLASTY MESH, SUBSEQUENT ENCOUNTER: ICD-10-CM

## 2024-06-11 DIAGNOSIS — A49.1 STREPTOCOCCAL INFECTION: ICD-10-CM

## 2024-06-11 DIAGNOSIS — R53.83 OTHER FATIGUE: ICD-10-CM

## 2024-06-11 DIAGNOSIS — L02.211 ABDOMINAL WALL ABSCESS: ICD-10-CM

## 2024-06-11 PROBLEM — T85.79XA INFECTED HERNIOPLASTY MESH (HCC): Status: ACTIVE | Noted: 2024-06-11

## 2024-06-11 PROCEDURE — 99212 OFFICE O/P EST SF 10 MIN: CPT | Performed by: INTERNAL MEDICINE

## 2024-06-11 PROCEDURE — 3074F SYST BP LT 130 MM HG: CPT | Performed by: INTERNAL MEDICINE

## 2024-06-11 PROCEDURE — 99214 OFFICE O/P EST MOD 30 MIN: CPT | Performed by: INTERNAL MEDICINE

## 2024-06-11 PROCEDURE — 3078F DIAST BP <80 MM HG: CPT | Performed by: INTERNAL MEDICINE

## 2024-06-11 RX ORDER — CEFDINIR 300 MG/1
300 CAPSULE ORAL 2 TIMES DAILY
Qty: 180 CAPSULE | Refills: 1 | Status: SHIPPED | OUTPATIENT
Start: 2024-06-11 | End: 2024-09-09

## 2024-06-11 ASSESSMENT — FIBROSIS 4 INDEX: FIB4 SCORE: 2.86

## 2024-06-11 NOTE — PROGRESS NOTES
"[facility] INFECTIOUS DISEASES CLINIC FOLLOW-UP NOTE     Date of Service: 6/11/2024    Chief Complaint: Follow-up for intra-abdominal abscess    History of Present Illness:     Niya Stone is a 74 y.o. female patient.  Extensive review of documentation from multiple specialties performed in generating this HPI.  Patient with history of Herve-en-Y, and hernia repair with mesh placement admitted on 11/8/2023 for planned surgical intervention for infected mesh in the setting of left-sided abdominal pain.  Recent CT scan showed a pocket of fluid deep to the the mesh concerning for an abscess and mesh infection in October.  She is now status post exploratory laparotomy, extensive adhesiolysis, drainage of complex abdominal wall some facial abscess, excision of infected mesh and resection of segment of small bowel, fistula and chronic abscess wall rind and omentum in the 11/8/2023.  However there is partial mesh that remains in place on the right side. Operative cultures are positive for Streptococcus anginosus.     Patient received 6 weeks of oral cefdinir and Flagyl through 12/20/2023, and has since been on chronic antibiotic suppression with plan to continue for about 6 months given some remnants of mesh that remained in the potentially infected surgical bed back in November 2023.    Patient has now been oral cefdinir for 6 months, has been doing quite well on the antibiotic with no issues.  She uses a probiotic as well.  She does have off-and-on bilateral abdominal pain as well as fatigue, occasional night sweats without fevers.  No diarrhea.     Review of Systems:  All other systems reviewed and are negative expect as noted in HPI    Past Medical History:   Diagnosis Date    Anesthesia     \"Hard to get breath when first waking up\"    Arrhythmia     can feel a \"flutter but has been checked out and is fine\"    ASTHMA     rare inhaler use    Bronchitis     Hiatus hernia syndrome     Pain     abdomen    Panic attacks "     Pneumonia     Psychiatric problem     anxiety panic attacks    Sleep apnea     no longer with weight lose surgery    Unspecified urinary incontinence     wears pads       Past Surgical History:   Procedure Laterality Date    IN EXPLORATORY OF ABDOMEN N/A 2023    Procedure: EXPLORATORY LAPAROTOMY, REMOVAL OF INFECTED MESH,  SMALL BOWEL RESECTION, INSICION AND DRAINAGE OF ABSCESS;  Surgeon: Louie Wei M.D.;  Location: [facility];  Service: General    INCISION HERNIA REPAIR N/A 2023    Procedure: REPAIR, COMPLEX HERNIA, INCISIONAL;  Surgeon: Louie Wei M.D.;  Location: [facility];  Service: General    GASTROSCOPY  2015    Procedure: GASTRIC BYPASS LAPAROSCOPY REVISION ENDOSCOPIC MINA EN Y, GASTRIC OULET REPAIR ;  Surgeon: Louie Wei M.D.;  Location: [facility];  Service:     MAMMOPLASTY REDUCTION  2012    Performed by OMAYRA GOYAL at [facility]    LIPOSUCTION  2012    Performed by OMAYRA GOYAL at [facility]    HB COSMETIC ADJUSTMENT      arm skin reduction, back skin removal, s/p gastric bypass    GASTRIC BYPASS LAPAROSCOPIC      CHOLECYSTECTOMY  1993    HYSTERECTOMY, TOTAL ABDOMINAL  1991    IN  DELIVERY ONLY  1981    NERVE ULNAR REPAIR OR EXPLORE      HERNIA REPAIR  [phone]    multiple       Family History   Problem Relation Age of Onset    Hypertension Mother        Social History     Socioeconomic History    Marital status:      Spouse name: Not on file    Number of children: Not on file    Years of education: Not on file    Highest education level: Not on file   Occupational History    Not on file   Tobacco Use    Smoking status: Former     Current packs/day: 0.00     Average packs/day: 1 pack/day for 1.5 years (1.5 ttl pk-yrs)     Types: Cigarettes     Start date: 1968     Quit date: 1970     Years since quittin.4    Smokeless tobacco: Never   Vaping Use    Vaping  "status: Never Used   Substance and Sexual Activity    Alcohol use: No    Drug use: No    Sexual activity: Not on file   Other Topics Concern    Not on file   Social History Narrative    Not on file     Social Determinants of Health     Financial Resource Strain: Not on file   Food Insecurity: Not on file   Transportation Needs: Not on file   Physical Activity: Not on file   Stress: Not on file   Social Connections: Not on file   Intimate Partner Violence: Not on file   Housing Stability: Not on file       Allergies   Allergen Reactions    Iodine Contrast [Diagnostic X-Ray Materials] Swelling    Penicillins Rash     Whole trunk area    Tape Rash     Steri strips - open sores  -  Paper tape & plastic tape OK    Morphine      2003-09-22;blue lips    Seasonal        Medications:  Current Outpatient Medications on File Prior to Visit   Medication Sig Dispense Refill    fluoxetine (PROZAC) 40 MG capsule Take 40 mg by mouth every day.      hydrOXYzine HCl (ATARAX) 10 MG Tab Take 10 mg by mouth every 6 hours as needed for Anxiety.      APPLE CIDER VINEGAR PO Take 1 Tablet by mouth every day.      montelukast (SINGULAIR) 10 MG Tab Take 10 mg by mouth at bedtime.      Cyanocobalamin (VITAMIN B-12 INJ) Inject 1 Each as directed every 30 (thirty) days.      albuterol 108 (90 BASE) MCG/ACT Aero Soln inhalation aerosol Inhale 2 Puffs by mouth every 6 hours as needed for Shortness of Breath. 8.5 g 3     No current facility-administered medications on file prior to visit.       Physical Exam:   Vital Signs: /60 (BP Location: Left arm, Patient Position: Sitting, BP Cuff Size: Adult)   Pulse 80   Temp 36.7 °C (98 °F) (Temporal)   Resp 16   Ht 1.676 m (5' 6\")   Wt 75.3 kg (166 lb)   SpO2 96%   BMI 26.79 kg/m²   Vital signs reviewed  Constitutional: Patient is oriented to person, place, and time. Appears well-developed and well-nourished. No distress  Head: Atraumatic, normocephalic  Eyes: Conjunctivae " normal  Mouth/Throat: Lips without lesions  Cardiovascular: Normal rate  Pulmonary/Chest: No respiratory distress. Unlabored respiratory effort  Abdominal: Non distended.  Abdominal discomfort bilaterally, well-healed surgical scar.  No obvious erythema or increased warmth or fluctuance  Skin: Skin is warm and dry. No rashes or embolic phenomena noted on exposed skin  Psychiatric: Normal mood and affect. Behavior is normal.     LABS:  WBC   Date/Time Value Ref Range Status   11/13/2023 04:44 AM 8.8 4.8 - 10.8 K/uL Final     RBC   Date/Time Value Ref Range Status   11/13/2023 04:44 AM 3.74 (L) 4.20 - 5.40 M/uL Final     Hemoglobin   Date/Time Value Ref Range Status   11/13/2023 04:44 AM 11.4 (L) 12.0 - 16.0 g/dL Final     Hematocrit   Date/Time Value Ref Range Status   11/13/2023 04:44 AM 36.5 (L) 37.0 - 47.0 % Final     MCV   Date/Time Value Ref Range Status   11/13/2023 04:44 AM 97.6 81.4 - 97.8 fL Final     MCH   Date/Time Value Ref Range Status   11/13/2023 04:44 AM 30.5 27.0 - 33.0 pg Final     MCHC   Date/Time Value Ref Range Status   11/13/2023 04:44 AM 31.2 (L) 32.2 - 35.5 g/dL Final     Comment:     Please note new reference range effective 05/22/2023.     MPV   Date/Time Value Ref Range Status   11/13/2023 04:44 AM 10.0 9.0 - 12.9 fL Final     Sodium   Date/Time Value Ref Range Status   11/13/2023 04:44  135 - 145 mmol/L Final     Potassium   Date/Time Value Ref Range Status   11/13/2023 04:44 AM 3.9 3.6 - 5.5 mmol/L Final     Chloride   Date/Time Value Ref Range Status   11/13/2023 04:44  96 - 112 mmol/L Final     Co2   Date/Time Value Ref Range Status   11/13/2023 04:44 AM 29 20 - 33 mmol/L Final     Glucose   Date/Time Value Ref Range Status   11/13/2023 04:44 AM 92 65 - 99 mg/dL Final     Bun   Date/Time Value Ref Range Status   11/13/2023 04:44 AM 3 (L) 8 - 22 mg/dL Final     Creatinine   Date/Time Value Ref Range Status   11/13/2023 04:44 AM 0.34 (L) 0.50 - 1.40 mg/dL Final     Glom Filt  "Rate, Est   Date/Time Value Ref Range Status   09/20/2023 11:54 AM 83 >60 mL/min/1.7 Final     Comment:     Estimated GFR derived from the MDRD Study equation can be used in patients who are in the hospital.  However, it is important to pay attention to potential inaccuracies due to the non-steady state of serum creatinine, co-morbidities that cause malnutrition, and the use of medications that interfere with the measurement of serum creatinine.    The estimated GFR is only accurate for patients greater than 18 years of age.     Alkaline Phosphatase   Date/Time Value Ref Range Status   11/13/2023 04:44 AM 96 30 - 99 U/L Final     AST(SGOT)   Date/Time Value Ref Range Status   11/13/2023 04:44 AM 70 (H) 12 - 45 U/L Final     ALT(SGPT)   Date/Time Value Ref Range Status   11/13/2023 04:44 AM 30 2 - 50 U/L Final     Total Bilirubin   Date/Time Value Ref Range Status   11/13/2023 04:44 AM 0.2 0.1 - 1.5 mg/dL Final      No results found for: \"CPKTOTAL\"     MICRO:  No results found for: \"BLOODCULTU\", \"BLDCULT\", \"BCHOLD\"      Latest pertinent labs were reviewed    IMAGING STUDIES:  As above    Assessment:   Niya Stone is a very pleasant 74 y.o. female patient with history of Herve-en-Y, and hernia repair with mesh placement was admitted on 11/8/2023 for planned surgical intervention for infected mesh. Recent CT scan showed a pocket of fluid deep to the the mesh concerning for an abscess and mesh infection in October.  She is now status post exploratory laparotomy, extensive adhesiolysis, drainage of complex abdominal wall some facial abscess, excision of infected mesh and resection of segment of small bowel, fistula and chronic abscess wall rind and omentum in the 11/8/2023.  However there is partial mesh that remains in place on the right side. Operative cultures are positive for Streptococcus anginosus.     Patient received 6 weeks of oral cefdinir and Flagyl through 12/20/2023, and has since been on chronic " antibiotic suppression with cefdinir alone which she has been now on a total of 6 months and has been tolerating quite well. She does have off-and-on bilateral abdominal pain as well as fatigue, occasional night sweats without fevers.  No diarrhea.    There is no established evidence for how long to continue antibiotic suppression in these cases.  Given that the patient is tolerating the cefdinir quite well, will attempt to go for a year suppression followed by discontinuation and monitoring for any recurrence.  Given the abdominal pain and fatigue, night sweats, will also obtain a scan to look for any underlying persistent active infection such as an abscess.    Labs from today reviewed with no acute significant abnormalities.    Plan:   -CT abdomen and pelvis  -ESR and CRP  -Refilled cefdinir.  Anticipate continuing through the end of this year and then stopping and monitoring carefully off of antibiotics    Return to ID clinic in 1 month after above CT scan    Fred Martin M.D.    Please note that this dictation was created using voice recognition software. I have worked with technical experts from [facility] to optimize the interface.  I have made every reasonable attempt to correct obvious errors, but there may be errors of grammar and possibly content that I did not discover before finalizing the note.

## 2024-06-25 DIAGNOSIS — L02.211 ABDOMINAL WALL ABSCESS: ICD-10-CM

## 2024-06-25 DIAGNOSIS — T85.79XD INFECTED PROSTHETIC MESH OF ABDOMINAL WALL, SUBSEQUENT ENCOUNTER: ICD-10-CM

## 2024-06-25 DIAGNOSIS — A49.1 STREPTOCOCCAL INFECTION: ICD-10-CM

## 2024-06-27 ENCOUNTER — HOSPITAL ENCOUNTER (OUTPATIENT)
Dept: RADIOLOGY | Facility: MEDICAL CENTER | Age: 74
End: 2024-06-27
Attending: INTERNAL MEDICINE
Payer: COMMERCIAL

## 2024-06-27 DIAGNOSIS — T85.79XD INFECTED PROSTHETIC MESH OF ABDOMINAL WALL, SUBSEQUENT ENCOUNTER: ICD-10-CM

## 2024-06-27 DIAGNOSIS — R53.83 OTHER FATIGUE: ICD-10-CM

## 2024-06-27 DIAGNOSIS — A49.1 STREPTOCOCCAL INFECTION: ICD-10-CM

## 2024-06-27 PROCEDURE — 700117 HCHG RX CONTRAST REV CODE 255: Performed by: INTERNAL MEDICINE

## 2024-06-27 PROCEDURE — 74177 CT ABD & PELVIS W/CONTRAST: CPT

## 2024-06-27 RX ADMIN — IOHEXOL 25 ML: 240 INJECTION, SOLUTION INTRATHECAL; INTRAVASCULAR; INTRAVENOUS; ORAL at 10:13

## 2024-06-27 RX ADMIN — IOHEXOL 100 ML: 350 INJECTION, SOLUTION INTRAVENOUS at 10:13

## 2024-07-09 DIAGNOSIS — T85.79XD INFECTED PROSTHETIC MESH OF ABDOMINAL WALL, SUBSEQUENT ENCOUNTER: ICD-10-CM

## 2024-07-09 DIAGNOSIS — R19.7 DIARRHEA OF PRESUMED INFECTIOUS ORIGIN: ICD-10-CM

## 2024-07-23 ENCOUNTER — TELEPHONE (OUTPATIENT)
Dept: INFECTIOUS DISEASES | Facility: MEDICAL CENTER | Age: 74
End: 2024-07-23
Payer: COMMERCIAL

## 2024-07-23 ENCOUNTER — APPOINTMENT (OUTPATIENT)
Dept: INFECTIOUS DISEASES | Facility: MEDICAL CENTER | Age: 74
End: 2024-07-23
Attending: INTERNAL MEDICINE
Payer: COMMERCIAL

## 2024-08-13 ENCOUNTER — TELEPHONE (OUTPATIENT)
Dept: INFECTIOUS DISEASES | Facility: MEDICAL CENTER | Age: 74
End: 2024-08-13
Payer: COMMERCIAL

## 2024-08-13 NOTE — TELEPHONE ENCOUNTER
Spoke with patient who is requesting Cdiff test results, scanned in media. Test originally ordered by Dr Martin 7/9 however patient reports she saw improvement of bowel moves so she did not take test until recently. Patient reports that she saw her results on [facility] portal and these are negative, also reports her symptoms have already improved. Last appt cancelled by patient I scheduled follow up 9/10. Patient also reporting that she had a visit with [facility] for knee pain concerns of infection  and provider order test.

## 2024-08-26 ENCOUNTER — APPOINTMENT (OUTPATIENT)
Dept: RADIOLOGY | Facility: MEDICAL CENTER | Age: 74
End: 2024-08-26
Attending: EMERGENCY MEDICINE
Payer: COMMERCIAL

## 2024-08-26 ENCOUNTER — HOSPITAL ENCOUNTER (EMERGENCY)
Facility: MEDICAL CENTER | Age: 74
End: 2024-08-26
Attending: EMERGENCY MEDICINE
Payer: COMMERCIAL

## 2024-08-26 VITALS
WEIGHT: 166.89 LBS | RESPIRATION RATE: 16 BRPM | OXYGEN SATURATION: 94 % | BODY MASS INDEX: 27.81 KG/M2 | HEART RATE: 67 BPM | SYSTOLIC BLOOD PRESSURE: 100 MMHG | HEIGHT: 65 IN | DIASTOLIC BLOOD PRESSURE: 55 MMHG | TEMPERATURE: 97 F

## 2024-08-26 DIAGNOSIS — R19.7 DIARRHEA, UNSPECIFIED TYPE: ICD-10-CM

## 2024-08-26 DIAGNOSIS — R11.2 NAUSEA AND VOMITING, UNSPECIFIED VOMITING TYPE: ICD-10-CM

## 2024-08-26 DIAGNOSIS — R10.13 EPIGASTRIC PAIN: ICD-10-CM

## 2024-08-26 LAB
ALBUMIN SERPL BCP-MCNC: 3.9 G/DL (ref 3.2–4.9)
ALBUMIN/GLOB SERPL: 1.1 G/DL
ALP SERPL-CCNC: 79 U/L (ref 30–99)
ALT SERPL-CCNC: 23 U/L (ref 2–50)
ANION GAP SERPL CALC-SCNC: 15 MMOL/L (ref 7–16)
APPEARANCE UR: CLEAR
AST SERPL-CCNC: 22 U/L (ref 12–45)
BASOPHILS # BLD AUTO: 0.3 % (ref 0–1.8)
BASOPHILS # BLD: 0.02 K/UL (ref 0–0.12)
BILIRUB SERPL-MCNC: 0.6 MG/DL (ref 0.1–1.5)
BILIRUB UR QL STRIP.AUTO: NEGATIVE
BUN SERPL-MCNC: 18 MG/DL (ref 8–22)
CALCIUM ALBUM COR SERPL-MCNC: 8.8 MG/DL (ref 8.5–10.5)
CALCIUM SERPL-MCNC: 8.7 MG/DL (ref 8.4–10.2)
CHLORIDE SERPL-SCNC: 102 MMOL/L (ref 96–112)
CO2 SERPL-SCNC: 19 MMOL/L (ref 20–33)
COLOR UR: YELLOW
CREAT SERPL-MCNC: 0.71 MG/DL (ref 0.5–1.4)
EOSINOPHIL # BLD AUTO: 0.01 K/UL (ref 0–0.51)
EOSINOPHIL NFR BLD: 0.2 % (ref 0–6.9)
ERYTHROCYTE [DISTWIDTH] IN BLOOD BY AUTOMATED COUNT: 43.9 FL (ref 35.9–50)
GFR SERPLBLD CREATININE-BSD FMLA CKD-EPI: 88 ML/MIN/1.73 M 2
GLOBULIN SER CALC-MCNC: 3.4 G/DL (ref 1.9–3.5)
GLUCOSE SERPL-MCNC: 117 MG/DL (ref 65–99)
GLUCOSE UR STRIP.AUTO-MCNC: NEGATIVE MG/DL
HCT VFR BLD AUTO: 50.4 % (ref 37–47)
HGB BLD-MCNC: 16.5 G/DL (ref 12–16)
IMM GRANULOCYTES # BLD AUTO: 0.02 K/UL (ref 0–0.11)
IMM GRANULOCYTES NFR BLD AUTO: 0.3 % (ref 0–0.9)
KETONES UR STRIP.AUTO-MCNC: 15 MG/DL
LEUKOCYTE ESTERASE UR QL STRIP.AUTO: NEGATIVE
LIPASE SERPL-CCNC: 22 U/L (ref 11–82)
LYMPHOCYTES # BLD AUTO: 0.5 K/UL (ref 1–4.8)
LYMPHOCYTES NFR BLD: 7.8 % (ref 22–41)
MCH RBC QN AUTO: 32.1 PG (ref 27–33)
MCHC RBC AUTO-ENTMCNC: 32.7 G/DL (ref 32.2–35.5)
MCV RBC AUTO: 98.1 FL (ref 81.4–97.8)
MICRO URNS: ABNORMAL
MONOCYTES # BLD AUTO: 0.35 K/UL (ref 0–0.85)
MONOCYTES NFR BLD AUTO: 5.5 % (ref 0–13.4)
NEUTROPHILS # BLD AUTO: 5.48 K/UL (ref 1.82–7.42)
NEUTROPHILS NFR BLD: 85.9 % (ref 44–72)
NITRITE UR QL STRIP.AUTO: NEGATIVE
NRBC # BLD AUTO: 0 K/UL
NRBC BLD-RTO: 0 /100 WBC (ref 0–0.2)
PH UR STRIP.AUTO: 5.5 [PH] (ref 5–8)
PLATELET # BLD AUTO: 248 K/UL (ref 164–446)
PMV BLD AUTO: 10 FL (ref 9–12.9)
POTASSIUM SERPL-SCNC: 4.1 MMOL/L (ref 3.6–5.5)
PROT SERPL-MCNC: 7.3 G/DL (ref 6–8.2)
PROT UR QL STRIP: NEGATIVE MG/DL
RBC # BLD AUTO: 5.14 M/UL (ref 4.2–5.4)
RBC UR QL AUTO: NEGATIVE
SODIUM SERPL-SCNC: 136 MMOL/L (ref 135–145)
SP GR UR STRIP.AUTO: >=1.03
WBC # BLD AUTO: 6.4 K/UL (ref 4.8–10.8)

## 2024-08-26 PROCEDURE — A9270 NON-COVERED ITEM OR SERVICE: HCPCS | Performed by: EMERGENCY MEDICINE

## 2024-08-26 PROCEDURE — 85025 COMPLETE CBC W/AUTO DIFF WBC: CPT

## 2024-08-26 PROCEDURE — 96374 THER/PROPH/DIAG INJ IV PUSH: CPT

## 2024-08-26 PROCEDURE — 700102 HCHG RX REV CODE 250 W/ 637 OVERRIDE(OP): Performed by: EMERGENCY MEDICINE

## 2024-08-26 PROCEDURE — 74176 CT ABD & PELVIS W/O CONTRAST: CPT

## 2024-08-26 PROCEDURE — 99285 EMERGENCY DEPT VISIT HI MDM: CPT

## 2024-08-26 PROCEDURE — 80053 COMPREHEN METABOLIC PANEL: CPT

## 2024-08-26 PROCEDURE — 96375 TX/PRO/DX INJ NEW DRUG ADDON: CPT

## 2024-08-26 PROCEDURE — 700105 HCHG RX REV CODE 258: Performed by: EMERGENCY MEDICINE

## 2024-08-26 PROCEDURE — 81003 URINALYSIS AUTO W/O SCOPE: CPT

## 2024-08-26 PROCEDURE — 36415 COLL VENOUS BLD VENIPUNCTURE: CPT

## 2024-08-26 PROCEDURE — 83690 ASSAY OF LIPASE: CPT

## 2024-08-26 PROCEDURE — 700111 HCHG RX REV CODE 636 W/ 250 OVERRIDE (IP): Mod: JZ | Performed by: EMERGENCY MEDICINE

## 2024-08-26 RX ORDER — SODIUM CHLORIDE, SODIUM LACTATE, POTASSIUM CHLORIDE, CALCIUM CHLORIDE 600; 310; 30; 20 MG/100ML; MG/100ML; MG/100ML; MG/100ML
1000 INJECTION, SOLUTION INTRAVENOUS ONCE
Status: COMPLETED | OUTPATIENT
Start: 2024-08-26 | End: 2024-08-26

## 2024-08-26 RX ORDER — ONDANSETRON 4 MG/1
4 TABLET, ORALLY DISINTEGRATING ORAL EVERY 6 HOURS PRN
Qty: 10 TABLET | Refills: 0 | Status: SHIPPED | OUTPATIENT
Start: 2024-08-26

## 2024-08-26 RX ORDER — HYDROCODONE BITARTRATE AND ACETAMINOPHEN 5; 325 MG/1; MG/1
1 TABLET ORAL ONCE
Status: COMPLETED | OUTPATIENT
Start: 2024-08-26 | End: 2024-08-26

## 2024-08-26 RX ORDER — HYDROMORPHONE HYDROCHLORIDE 1 MG/ML
0.5 INJECTION, SOLUTION INTRAMUSCULAR; INTRAVENOUS; SUBCUTANEOUS ONCE
Status: COMPLETED | OUTPATIENT
Start: 2024-08-26 | End: 2024-08-26

## 2024-08-26 RX ORDER — ONDANSETRON 2 MG/ML
4 INJECTION INTRAMUSCULAR; INTRAVENOUS ONCE
Status: COMPLETED | OUTPATIENT
Start: 2024-08-26 | End: 2024-08-26

## 2024-08-26 RX ADMIN — HYDROMORPHONE HYDROCHLORIDE 0.5 MG: 1 INJECTION, SOLUTION INTRAMUSCULAR; INTRAVENOUS; SUBCUTANEOUS at 11:46

## 2024-08-26 RX ADMIN — ONDANSETRON 4 MG: 2 INJECTION INTRAMUSCULAR; INTRAVENOUS at 11:47

## 2024-08-26 RX ADMIN — SODIUM CHLORIDE, POTASSIUM CHLORIDE, SODIUM LACTATE AND CALCIUM CHLORIDE 1000 ML: 600; 310; 30; 20 INJECTION, SOLUTION INTRAVENOUS at 15:12

## 2024-08-26 RX ADMIN — SODIUM CHLORIDE, POTASSIUM CHLORIDE, SODIUM LACTATE AND CALCIUM CHLORIDE 1000 ML: 600; 310; 30; 20 INJECTION, SOLUTION INTRAVENOUS at 11:47

## 2024-08-26 RX ADMIN — HYDROCODONE BITARTRATE AND ACETAMINOPHEN 1 TABLET: 5; 325 TABLET ORAL at 13:32

## 2024-08-26 ASSESSMENT — PAIN DESCRIPTION - PAIN TYPE: TYPE: ACUTE PAIN

## 2024-08-26 ASSESSMENT — FIBROSIS 4 INDEX: FIB4 SCORE: 3.33

## 2024-08-26 NOTE — ED TRIAGE NOTES
"Chief Complaint   Patient presents with    LUQ Pain     Started last night  Concerns for possible Pancreatitis w/ hx of same      N/V     /69   Pulse 89   Temp 36.6 °C (97.8 °F) (Temporal)   Resp 14   Ht 1.651 m (5' 5\")   Wt 75.7 kg (166 lb 14.2 oz)   SpO2 94%   BMI 27.77 kg/m²     Pt ambulated to ED by self for c/o LUQ pain radiating across abd started last night w/ N/V. Pt states is on long term antibx for extensive abd surgery, reports hx of Pancreatitis.    "

## 2024-08-26 NOTE — ED PROVIDER NOTES
ED Provider Note    CHIEF COMPLAINT  Chief Complaint   Patient presents with    LUQ Pain     Started last night  Concerns for possible Pancreatitis w/ hx of same      N/V       EXTERNAL RECORDS REVIEWED  Reviewed baseline laboratory studies as well as extensive abdominal surgeries performed in November 2023:  DATE OF SERVICE:  11/08/2023      PREOPERATIVE DIAGNOSES:  1.  Complex left abdominal wall subfascial abscess.  2.  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  3.  History of complex abdominal wall reconstructions and incisional hernia   repairs with mesh.     POSTOPERATIVE DIAGNOSES:  1.  Complex abdominal wall and subfascial abscess.  2.  Small bowel fistula.  3.  Intra-abdominal adhesions.  4.  Infected mesh material.     OPERATIONS PERFORMED:  1.  Exploratory laparotomy.  2.  Extensive adhesiolysis.  3.  Drainage of complex abdominal wall subfascial abscess.  4.  Excision of infected mesh.  5.  Resection of segment of small bowel, fistula, and chronic abscess wall   rind and omentum.    HPI/ROS  LIMITATION TO HISTORY   None  OUTSIDE HISTORIAN(S):  None    Niya Stone is a 74 y.o. female who presents valuation of left upper quadrant discomfort nausea and diarrhea.  The patient has a complex history including the recent above surgery.  She has had extensive surgeries in addition to the above-mentioned surgery.  She also had previous pancreatitis and felt the same sensation of left upper quadrant pain but also had significant nonbloody diarrhea.  She denies high fevers or chills no night sweats or weight loss.  No recent exotic or foreign travel or antibiotic use    PAST MEDICAL HISTORY   has a past medical history of Anesthesia, Arrhythmia, ASTHMA, Bronchitis, Hiatus hernia syndrome, Pain, Panic attacks, Pneumonia (2003), Psychiatric problem, Sleep apnea, and Unspecified urinary incontinence.    SURGICAL HISTORY   has a past surgical history that includes hysterectomy, total abdominal (1991); cholecystectomy  ();  delivery only (); nerve ulnar repair or explore (); hernia repair (1564-1660); gastric bypass laparoscopic (); hb cosmetic adjustment (); mammoplasty reduction (2012); liposuction (2012); gastroscopy (2015); exploratory of abdomen (N/A, 2023); and incision hernia repair (N/A, 2023).    FAMILY HISTORY  Family History   Problem Relation Age of Onset    Hypertension Mother        SOCIAL HISTORY  Social History     Tobacco Use    Smoking status: Former     Current packs/day: 0.00     Average packs/day: 1 pack/day for 1.5 years (1.5 ttl pk-yrs)     Types: Cigarettes     Start date: 1968     Quit date: 1970     Years since quittin.6    Smokeless tobacco: Never   Vaping Use    Vaping status: Never Used   Substance and Sexual Activity    Alcohol use: No    Drug use: No    Sexual activity: Not on file       CURRENT MEDICATIONS  Home Medications    **Home medications have not yet been reviewed for this encounter**       Audit from Redirected Encounters    **Home medications have not yet been reviewed for this encounter**       Current Facility-Administered Medications:     lactated ringers (LR) bolus, 1,000 mL, Intravenous, Once, Fan Hoover M.D.    Current Outpatient Medications:     ondansetron (ZOFRAN ODT) 4 MG TABLET DISPERSIBLE, Take 1 Tablet by mouth every 6 hours as needed for Nausea/Vomiting., Disp: 10 Tablet, Rfl: 0    cefdinir (OMNICEF) 300 MG Cap, Take 1 Capsule by mouth 2 times a day for 90 days., Disp: 180 Capsule, Rfl: 1    fluoxetine (PROZAC) 40 MG capsule, Take 40 mg by mouth every day., Disp: , Rfl:     hydrOXYzine HCl (ATARAX) 10 MG Tab, Take 10 mg by mouth every 6 hours as needed for Anxiety., Disp: , Rfl:     APPLE CIDER VINEGAR PO, Take 1 Tablet by mouth every day., Disp: , Rfl:     montelukast (SINGULAIR) 10 MG Tab, Take 10 mg by mouth at bedtime., Disp: , Rfl:     Cyanocobalamin (VITAMIN B-12 INJ), Inject 1 Each as directed every  "30 (thirty) days., Disp: , Rfl:     albuterol 108 (90 BASE) MCG/ACT Aero Soln inhalation aerosol, Inhale 2 Puffs by mouth every 6 hours as needed for Shortness of Breath., Disp: 8.5 g, Rfl: 3      ALLERGIES  Allergies   Allergen Reactions    Iodine Contrast [Diagnostic X-Ray Materials] Swelling    Penicillins Rash     Whole trunk area    Tape Rash     Steri strips - open sores  -  Paper tape & plastic tape OK    Morphine      2003-09-22;blue lips    Seasonal        PHYSICAL EXAM  VITAL SIGNS: /56   Pulse 79   Temp 36.6 °C (97.8 °F) (Temporal)   Resp 14   Ht 1.651 m (5' 5\")   Wt 75.7 kg (166 lb 14.2 oz)   SpO2 96%   BMI 27.77 kg/m²    Pulse ox interpretation: I interpret this pulse ox as normal.  Constitutional: Alert and oriented x 3, mild distress  HEENT: Atraumatic normocephalic, pupils are equal round reactive to light extraocular movements are intact. The nares is clear, external ears are normal, mouth shows moist mucous membranes normal dentition for age  Neck: Supple, no JVD no tracheal deviation  Cardiovascular: Regular rate and rhythm no murmur rub or gallop 2+ pulses peripherally x4  Thorax & Lungs: No respiratory distress, no wheezes rales or rhonchi, No chest tenderness.   GI: Soft mild epigastric discomfort without rebound guarding or rigidity  Skin: Warm dry no acute rash or lesion  Musculoskeletal: Moving all extremities with full range and 5 of 5 strength no acute  deformity  Neurologic: Cranial nerves III through XII are grossly intact no sensory deficit no cerebellar dysfunction   Psychiatric: Anxious        EKG/LABS  Results for orders placed or performed during the hospital encounter of 08/26/24   CBC WITH DIFFERENTIAL   Result Value Ref Range    WBC 6.4 4.8 - 10.8 K/uL    RBC 5.14 4.20 - 5.40 M/uL    Hemoglobin 16.5 (H) 12.0 - 16.0 g/dL    Hematocrit 50.4 (H) 37.0 - 47.0 %    MCV 98.1 (H) 81.4 - 97.8 fL    MCH 32.1 27.0 - 33.0 pg    MCHC 32.7 32.2 - 35.5 g/dL    RDW 43.9 35.9 - 50.0 " fL    Platelet Count 248 164 - 446 K/uL    MPV 10.0 9.0 - 12.9 fL    Neutrophils-Polys 85.90 (H) 44.00 - 72.00 %    Lymphocytes 7.80 (L) 22.00 - 41.00 %    Monocytes 5.50 0.00 - 13.40 %    Eosinophils 0.20 0.00 - 6.90 %    Basophils 0.30 0.00 - 1.80 %    Immature Granulocytes 0.30 0.00 - 0.90 %    Nucleated RBC 0.00 0.00 - 0.20 /100 WBC    Neutrophils (Absolute) 5.48 1.82 - 7.42 K/uL    Lymphs (Absolute) 0.50 (L) 1.00 - 4.80 K/uL    Monos (Absolute) 0.35 0.00 - 0.85 K/uL    Eos (Absolute) 0.01 0.00 - 0.51 K/uL    Baso (Absolute) 0.02 0.00 - 0.12 K/uL    Immature Granulocytes (abs) 0.02 0.00 - 0.11 K/uL    NRBC (Absolute) 0.00 K/uL   Comp Metabolic Panel   Result Value Ref Range    Sodium 136 135 - 145 mmol/L    Potassium 4.1 3.6 - 5.5 mmol/L    Chloride 102 96 - 112 mmol/L    Co2 19 (L) 20 - 33 mmol/L    Anion Gap 15.0 7.0 - 16.0    Glucose 117 (H) 65 - 99 mg/dL    Bun 18 8 - 22 mg/dL    Creatinine 0.71 0.50 - 1.40 mg/dL    Calcium 8.7 8.4 - 10.2 mg/dL    Correct Calcium 8.8 8.5 - 10.5 mg/dL    AST(SGOT) 22 12 - 45 U/L    ALT(SGPT) 23 2 - 50 U/L    Alkaline Phosphatase 79 30 - 99 U/L    Total Bilirubin 0.6 0.1 - 1.5 mg/dL    Albumin 3.9 3.2 - 4.9 g/dL    Total Protein 7.3 6.0 - 8.2 g/dL    Globulin 3.4 1.9 - 3.5 g/dL    A-G Ratio 1.1 g/dL   LIPASE   Result Value Ref Range    Lipase 22 11 - 82 U/L   ESTIMATED GFR   Result Value Ref Range    GFR (CKD-EPI) 88 >60 mL/min/1.73 m 2       I have independently interpreted this EKG    RADIOLOGY/PROCEDURES   I have independently interpreted the diagnostic imaging associated with this visit and am waiting the final reading from the radiologist.   My preliminary interpretation is as follows:     Radiologist interpretation:  CT-ABDOMEN-PELVIS W/O   Final Result      1.  No evidence of renal or ureteral stone or evidence of hydronephrosis.      2.  No evidence of bowel structure focal inflammatory change.      3.  Multiple surgical changes to include gastric bypass procedure,  cholecystectomy, ventral hernia repair and hysterectomy.      4.  Sigmoid diverticulosis.      5.  Stable small rim calcified splenic artery aneurysm.          COURSE & MEDICAL DECISION MAKING    ASSESSMENT, COURSE AND PLAN  Care Narrative:     This is a very pleasant 74-year-old female presents here with abrupt onset crampy abdominal pain nausea and vomiting with loose nonbloody stools.  An IV was established.  I performed extensive chart review and reviewed her previous surgical records most notably extensive surgery performed in November 2023.  Was concerned about several emergent conditions including perforated viscus bowel obstruction enteritis colitis diverticulitis not exclusively.  Patient was given parenteral nausea and pain medication.  Here her workup is reassuring.  There is no leukocytosis or bandemia and there is no significant abnormalities on metabolic panel and LFTs and lipase are normal.  CT scan was performed without contrast due to apparent history of true allergy.  There does not appear to be any acute process or obstructive pattern.  I suspect the patient has an underlying viral gastroenteritis.  The patient was given a liter of fluid and still has not made urine.  I will give her another bag of lactated Ringer's and give the patient a prescription of Zofran.  I performed serial abdominal exams there is no peritoneal findings.  I counseled the patient that surgical intervention antibiotics are not indicated.  She will be discharged home with sublingual Zofran clear liquid diet and return precautions have been reviewed    Hydration: Based on the patient's presentation of Acute Vomiting the patient was given IV fluids. IV Hydration was used because oral hydration was not adequate alone. Upon recheck following hydration, the patient was improving.          ADDITIONAL PROBLEMS MANAGED      DISPOSITION AND DISCUSSIONS  I have discussed management of the patient with the following physicians and  HARRIET's: None    Discussion of management with other Q or appropriate source(s): None    Escalation of care considered, and ultimately not performed: Considered admission    Barriers to care at this time, including but not limited to: None.     Decision tools and prescription drugs considered including, but not limited to: Patient will be prescribed Zofran.    FINAL DIAGNOSIS  1. Epigastric pain    2. Nausea and vomiting, unspecified vomiting type    3. Diarrhea, unspecified type         Electronically signed by: Fan Hoover M.D., 8/26/2024 11:49 AM

## 2024-11-19 ENCOUNTER — TELEPHONE (OUTPATIENT)
Dept: INFECTIOUS DISEASES | Facility: MEDICAL CENTER | Age: 74
End: 2024-11-19
Payer: COMMERCIAL

## 2024-11-19 NOTE — TELEPHONE ENCOUNTER
Left message for Dr Charlton that I will reach out to patient and schedule appt with the office to discuss stopping abx sooner

## 2024-11-19 NOTE — TELEPHONE ENCOUNTER
Called patient per Dr Martin notes     -CT abdomen and pelvis  -ESR and CRP  -Refilled cefdinir.  Anticipate continuing through the end of this year and then stopping and monitoring carefully off of antibiotics     Return to ID clinic in 1 month after above CT scan    Patient stated that she was only supposed to be on abx for 1 year after surgery. Advised and patient agreed to schedule follow up with one of our physicians to discuss further. Patient preferred to see Dr Martin only however no openings, scheduled next available with Dr Delgado.

## 2024-11-22 ENCOUNTER — HOSPITAL ENCOUNTER (OUTPATIENT)
Dept: RADIOLOGY | Facility: MEDICAL CENTER | Age: 74
End: 2024-11-22
Attending: INTERNAL MEDICINE
Payer: COMMERCIAL

## 2024-11-22 DIAGNOSIS — E78.00 PURE HYPERCHOLESTEROLEMIA: ICD-10-CM

## 2024-11-22 PROCEDURE — 4410556 CT-CARDIAC SCORING (SELF PAY ONLY)

## 2024-12-09 NOTE — PROGRESS NOTES
"Infectious Disease Follow up Note      Subjective:     Chief Complaint   Patient presents with    Follow-Up     Abdominal wall abscess           Interval History:   74-year-old woman with a history of Herve-en-Y, hernia repair with mesh placement, and hospitalization in November 2023 for infective mash in the setting of left-sided abdominal pain.  At that time CT scan showed a pocket of fluid deep to the mass concerning for an abscess and mesh infection.  She is status post exploratory laparotomy, extensive adhesiolysis, drainage of complex abdominal wall for abscess, excision of infected mesh and resection of segment of small bowel, fistula and chronic abscess wall rind and omentum on 11/8/2023.  However there is partial mesh that remains in place on the right side.  Operative cultures were positive for Streptococcus anginosus.  She completed a 6-week course of oral cefdinir and Flagyl through 12/20/2023 followed by oral cefdinir.  She was last seen in ID clinic on 6/11/2024 and was doing well and tolerating cefdinir without any issues.  The plan was to suppress the patient for a year.    Hospital records reviewed    Today, 12/12/2024: Patient continues to tolerate cefdinir without any new issues.  Since her procedure back in November 2023, she states that her bowels have never been normal.  Surgical site is clean.  She was seen at [facility] [place] yesterday and there are plans for a temporary device implantation in January 2025 which helps stimulate sensation to the bladder for urination.  She will have the permanent device implanted in February.  Patient asking whether she should remain on the antibiotics until after her procedure.    Review of Systems   Constitutional:  Negative for chills and fever.   Gastrointestinal:  Negative for abdominal pain.       Past Medical History:   Diagnosis Date    Anesthesia     \"Hard to get breath when first waking up\"    Arrhythmia     can feel a \"flutter but has been checked out " "and is fine\"    ASTHMA     rare inhaler use    Bronchitis     Hiatus hernia syndrome     Pain     abdomen    Panic attacks     Pneumonia 2003    Psychiatric problem     anxiety panic attacks    Sleep apnea     no longer with weight lose surgery    Unspecified urinary incontinence     wears pads       Past Surgical History:   Procedure Laterality Date    FL EXPLORATORY OF ABDOMEN N/A 2023    Procedure: EXPLORATORY LAPAROTOMY, REMOVAL OF INFECTED MESH,  SMALL BOWEL RESECTION, INSICION AND DRAINAGE OF ABSCESS;  Surgeon: Louie Wei M.D.;  Location: SURGERY [facility];  Service: General    INCISION HERNIA REPAIR N/A 2023    Procedure: REPAIR, COMPLEX HERNIA, INCISIONAL;  Surgeon: Louie Wei M.D.;  Location: SURGERY [facility];  Service: General    GASTROSCOPY  2015    Procedure: GASTRIC BYPASS LAPAROSCOPY REVISION ENDOSCOPIC MINA EN Y, GASTRIC OULET REPAIR ;  Surgeon: Louie Wei M.D.;  Location: [facility];  Service:     MAMMOPLASTY REDUCTION  2012    Performed by [facility]YRA GOYAL at [facility]    LIPOSUCTION  2012    Performed by OMAYRA GOYAL at [facility]    HB COSMETIC ADJUSTMENT      arm skin reduction, back skin removal, s/p gastric bypass    GASTRIC BYPASS LAPAROSCOPIC      CHOLECYSTECTOMY      HYSTERECTOMY, TOTAL ABDOMINAL      FL  DELIVERY ONLY      NERVE ULNAR REPAIR OR EXPLORE      HERNIA REPAIR  [id number]    multiple       Allergies:   Allergies   Allergen Reactions    Iodine Contrast [Diagnostic X-Ray Materials] Swelling    Penicillins Rash     Whole trunk area    Tape Rash     Steri strips - open sores  -  Paper tape & plastic tape OK    Morphine      2003;blue lips    Seasonal          Medications:  Current Outpatient Medications on File Prior to Visit   Medication Sig Dispense Refill    ondansetron (ZOFRAN ODT) 4 MG TABLET DISPERSIBLE Take 1 Tablet by mouth every 6 hours as needed for " "Nausea/Vomiting. 10 Tablet 0    fluoxetine (PROZAC) 40 MG capsule Take 40 mg by mouth every day.      hydrOXYzine HCl (ATARAX) 10 MG Tab Take 10 mg by mouth every 6 hours as needed for Anxiety.      APPLE CIDER VINEGAR PO Take 1 Tablet by mouth every day.      montelukast (SINGULAIR) 10 MG Tab Take 10 mg by mouth at bedtime.      Cyanocobalamin (VITAMIN B-12 INJ) Inject 1 Each as directed every 30 (thirty) days.      albuterol 108 (90 BASE) MCG/ACT Aero Soln inhalation aerosol Inhale 2 Puffs by mouth every 6 hours as needed for Shortness of Breath. 8.5 g 3     No current facility-administered medications on file prior to visit.         ROS  As documented above in my HPI       Objective:     PE:  BP 94/60 (BP Location: Left arm, Patient Position: Sitting, BP Cuff Size: Adult)   Pulse 67   Temp 36.4 °C (97.6 °F) (Temporal)   Resp 18   Ht 1.651 m (5' 5\")   Wt 75.3 kg (166 lb)   SpO2 95%   BMI 27.62 kg/m²      Vital signs reviewed  Constitutional: patient is oriented to person, place, and time. Appears well-developed and well-nourished. No distress  Eyes: Conjunctivae normal and EOM are normal. Pupils are equal, round, and reactive to light.   Mouth/Throat: Lips without lesions, good dentition, oropharynx is clear and moist.  Neck: Trachea midline. Normal range of motion. Neck supple. No masses  Cardiovascular: Normal rate, regular rhythm, normal heart sounds and intact distal pulses. No murmur, gallop, or friction rub. No edema.  Pulmonary/Chest: No respiratory distress. Unlabored respiratory effort, lungs clear to auscultation. No wheezes or rales.   Abdominal: Soft, non tender. BS + x 4. No masses or hepatosplenomegaly.   Musculoskeletal: Normal range of motion. No tenderness, swelling, erythema, deformity noted.  Neurological: alert and oriented to person, place, and time. No cranial nerve deficit. Coordination normal. Moves all extremities  Skin: Skin is warm and dry. Good turgor. No rashes " visable.  Psychiatric: Normal mood and affect. Behavior is normal.     LABS:  WBC   Date/Time Value Ref Range Status   08/26/2024 11:38 AM 6.4 4.8 - 10.8 K/uL Final     RBC   Date/Time Value Ref Range Status   08/26/2024 11:38 AM 5.14 4.20 - 5.40 M/uL Final     Hemoglobin   Date/Time Value Ref Range Status   08/26/2024 11:38 AM 16.5 (H) 12.0 - 16.0 g/dL Final     Hematocrit   Date/Time Value Ref Range Status   08/26/2024 11:38 AM 50.4 (H) 37.0 - 47.0 % Final     MCV   Date/Time Value Ref Range Status   08/26/2024 11:38 AM 98.1 (H) 81.4 - 97.8 fL Final     MCH   Date/Time Value Ref Range Status   08/26/2024 11:38 AM 32.1 27.0 - 33.0 pg Final     MCHC   Date/Time Value Ref Range Status   08/26/2024 11:38 AM 32.7 32.2 - 35.5 g/dL Final     MPV   Date/Time Value Ref Range Status   08/26/2024 11:38 AM 10.0 9.0 - 12.9 fL Final        Sodium   Date/Time Value Ref Range Status   08/26/2024 11:38  135 - 145 mmol/L Final     Potassium   Date/Time Value Ref Range Status   08/26/2024 11:38 AM 4.1 3.6 - 5.5 mmol/L Final     Comment:     The hemolysis index of the specimen exceeds the allowed tolerance for the  test.  Result may be affected.  Specimen recollection is recommended to  confirm the result. . by: [id number] on: 2024-08-26 12:01:49       Chloride   Date/Time Value Ref Range Status   08/26/2024 11:38  96 - 112 mmol/L Final     Co2   Date/Time Value Ref Range Status   08/26/2024 11:38 AM 19 (L) 20 - 33 mmol/L Final     Glucose   Date/Time Value Ref Range Status   08/26/2024 11:38  (H) 65 - 99 mg/dL Final     Bun   Date/Time Value Ref Range Status   08/26/2024 11:38 AM 18 8 - 22 mg/dL Final     Creatinine   Date/Time Value Ref Range Status   08/26/2024 11:38 AM 0.71 0.50 - 1.40 mg/dL Final     Glom Filt Rate, Est   Date/Time Value Ref Range Status   09/20/2023 11:54 AM 83 >60 mL/min/1.7 Final     Comment:     Estimated GFR derived from the MDRD Study equation can be used in patients who are in the hospital.   "However, it is important to pay attention to potential inaccuracies due to the non-steady state of serum creatinine, co-morbidities that cause malnutrition, and the use of medications that interfere with the measurement of serum creatinine.    The estimated GFR is only accurate for patients greater than 18 years of age.     Alkaline Phosphatase   Date/Time Value Ref Range Status   08/26/2024 11:38 AM 79 30 - 99 U/L Final     AST(SGOT)   Date/Time Value Ref Range Status   08/26/2024 11:38 AM 22 12 - 45 U/L Final     Comment:     The hemolysis index of the specimen exceeds the allowed tolerance for the  test.  Result may be affected.  Specimen recollection is recommended to  confirm the result. . by: [id number] on: 2024-08-26 12:01:49       ALT(SGPT)   Date/Time Value Ref Range Status   08/26/2024 11:38 AM 23 2 - 50 U/L Final     Comment:     The hemolysis index of the specimen exceeds the allowed tolerance for the  test.  Result may be affected.  Specimen recollection is recommended to  confirm the result. . by: [id number] on: 2024-08-26 12:01:49       Total Bilirubin   Date/Time Value Ref Range Status   08/26/2024 11:38 AM 0.6 0.1 - 1.5 mg/dL Final        No results found for: \"CPKTOTAL\"     MICRO:  No results found for: \"BLOODCULTU\", \"BLDCULT\", \"BCHOLD\"       IMAGING STUDIES:  CT a/p pelvis 6/27/2024  IMPRESSION:     1.  Cholecystectomy  2.  Atherosclerotic vascular calcification of the splenic artery with calcified segment of ectasia or 11 mm splenic artery aneurysm.  3.  Postoperative changes of the stomach.  4.  Small hiatus hernia.  5.  Interval resolution of inflammatory changes and fluid collection relating to the mesh repair of the left anterior abdominal wall/left lower quadrant.  6.  Diverticulosis of the sigmoid colon without evidence of acute diverticulitis.    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 " Chase is doing well clinically and continues to tolerate cefdinir.  The plan was to continue cefdinir through the end of December 2024.  However she will have a temporary urologic device implanted in January 2025 to help with stimulation of her bladder with a permanent device placed on February 18.  Given new device placement, we discussed benefits of remaining on cefdinir through the procedure to minimize any risk of infection.  She is in agreement with the plan.    Follow up: End of February with MD. PEOPLES with PCP for ongoing chronic medical conditions.     Gracy Delgado M.D.      Please note that this dictation was created using voice recognition software. I have worked with technical experts from [email or web address] to optimize the interface.  I have made every reasonable attempt to correct obvious errors, but there may be errors of grammar and possibly content that I did not discover before finalizing the note.

## 2024-12-12 ENCOUNTER — OFFICE VISIT (OUTPATIENT)
Dept: INFECTIOUS DISEASES | Facility: MEDICAL CENTER | Age: 74
End: 2024-12-12
Attending: INTERNAL MEDICINE
Payer: COMMERCIAL

## 2024-12-12 VITALS
DIASTOLIC BLOOD PRESSURE: 60 MMHG | TEMPERATURE: 97.6 F | SYSTOLIC BLOOD PRESSURE: 94 MMHG | BODY MASS INDEX: 27.66 KG/M2 | OXYGEN SATURATION: 95 % | HEART RATE: 67 BPM | HEIGHT: 65 IN | RESPIRATION RATE: 18 BRPM | WEIGHT: 166 LBS

## 2024-12-12 DIAGNOSIS — A49.1 STREPTOCOCCAL INFECTION: ICD-10-CM

## 2024-12-12 DIAGNOSIS — T85.79XD INFECTED PROSTHETIC MESH OF ABDOMINAL WALL, SUBSEQUENT ENCOUNTER: ICD-10-CM

## 2024-12-12 PROCEDURE — 3078F DIAST BP <80 MM HG: CPT | Performed by: INTERNAL MEDICINE

## 2024-12-12 PROCEDURE — 3074F SYST BP LT 130 MM HG: CPT | Performed by: INTERNAL MEDICINE

## 2024-12-12 PROCEDURE — 99214 OFFICE O/P EST MOD 30 MIN: CPT | Performed by: INTERNAL MEDICINE

## 2024-12-12 PROCEDURE — 99212 OFFICE O/P EST SF 10 MIN: CPT | Performed by: INTERNAL MEDICINE

## 2024-12-12 RX ORDER — CEFDINIR 300 MG/1
300 CAPSULE ORAL 2 TIMES DAILY
COMMUNITY

## 2024-12-12 ASSESSMENT — ENCOUNTER SYMPTOMS
CHILLS: 0
FEVER: 0
ABDOMINAL PAIN: 0

## 2024-12-12 ASSESSMENT — FIBROSIS 4 INDEX: FIB4 SCORE: 1.39

## 2025-01-10 ASSESSMENT — ENCOUNTER SYMPTOMS
FEVER: 0
CHILLS: 0
ABDOMINAL PAIN: 0

## 2025-01-10 NOTE — PROGRESS NOTES
"Infectious Disease Follow up Note      Subjective:     No chief complaint on file.        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  At that time CT scan showed a pocket of fluid deep to the mass concerning for an abscess and mesh infection.  She is status post exploratory laparotomy, extensive adhesiolysis, drainage of complex abdominal wall for abscess, excision of infected mesh and resection of segment of small bowel, fistula and chronic abscess wall rind and omentum on 11/8/2023.  However there is partial mesh that remains in place on the right side.  Operative cultures were positive for Streptococcus anginosus.  She completed a 6-week course of oral cefdinir and Flagyl through 12/20/2023 followed by oral cefdinir.  She was last seen in ID clinic on 6/11/2024 and was doing well and tolerating cefdinir without any issues.  The plan was to suppress the patient for a year.    Hospital records reviewed    12/12/2024: Patient continues to tolerate cefdinir without any new issues.  Since her procedure back in November 2023, she states that her bowels have never been normal.  Surgical site is clean.  She was seen at u[facility] [place] yesterday and there are plans for a temporary device implantation in January 2025 which helps stimulate sensation to the bladder for urination.  She will have the permanent device implanted in February.  Patient asking whether she should remain on the antibiotics until after her procedure.    Today, 1/16/25:    Review of Systems   Constitutional:  Negative for chills and fever.   Gastrointestinal:  Negative for abdominal pain.       Past Medical History:   Diagnosis Date    Anesthesia     \"Hard to get breath when first waking up\"    Arrhythmia     can feel a \"flutter but has been checked out and is fine\"    ASTHMA     rare inhaler use "    Bronchitis     Hiatus hernia syndrome     Pain     abdomen    Panic attacks     Pneumonia 2003    Psychiatric problem     anxiety panic attacks    Sleep apnea     no longer with weight lose surgery    Unspecified urinary incontinence     wears pads       Past Surgical History:   Procedure Laterality Date    DE EXPLORATORY OF ABDOMEN N/A 2023    Procedure: EXPLORATORY LAPAROTOMY, REMOVAL OF INFECTED MESH,  SMALL BOWEL RESECTION, INSICION AND DRAINAGE OF ABSCESS;  Surgeon: Louie Wei M.D.;  Location: [facility];  Service: General    INCISION HERNIA REPAIR N/A 2023    Procedure: REPAIR, COMPLEX HERNIA, INCISIONAL;  Surgeon: Louie Wei M.D.;  Location: [facility];  Service: General    GASTROSCOPY  2015    Procedure: GASTRIC BYPASS LAPAROSCOPY REVISION ENDOSCOPIC MINA EN Y, GASTRIC OULET REPAIR ;  Surgeon: Louie Wei M.D.;  Location: [facility];  Service:     MAMMOPLASTY REDUCTION  2012    Performed by OMAYRA GOYAL at [facility]    LIPOSUCTION  2012    Performed by OMAYRA GOYAL at [facility]    HB COSMETIC ADJUSTMENT  2004    arm skin reduction, back skin removal, s/p gastric bypass    GASTRIC BYPASS LAPAROSCOPIC      CHOLECYSTECTOMY      HYSTERECTOMY, TOTAL ABDOMINAL      DE  DELIVERY ONLY      NERVE ULNAR REPAIR OR EXPLORE      HERNIA REPAIR  [id number]    multiple       Allergies:   Allergies   Allergen Reactions    Iodine Contrast [[employer]] Swelling    Penicillins Rash     Whole trunk area    Tape Rash     Steri strips - open sores  -  Paper tape & plastic tape OK    Morphine      2003;blue lips    Seasonal          Medications:  Current Outpatient Medications on File Prior to Visit   Medication Sig Dispense Refill    cefdinir (OMNICEF) 300 MG Cap Take 300 mg by mouth 2 times a day.      ondansetron (ZOFRAN ODT) 4 MG TABLET DISPERSIBLE Take 1 Tablet by mouth  every 6 hours as needed for Nausea/Vomiting. 10 Tablet 0    fluoxetine (PROZAC) 40 MG capsule Take 40 mg by mouth every day.      hydrOXYzine HCl (ATARAX) 10 MG Tab Take 10 mg by mouth every 6 hours as needed for Anxiety.      APPLE CIDER VINEGAR PO Take 1 Tablet by mouth every day.      montelukast (SINGULAIR) 10 MG Tab Take 10 mg by mouth at bedtime.      Cyanocobalamin (VITAMIN B-12 INJ) Inject 1 Each as directed every 30 (thirty) days.      albuterol 108 (90 BASE) MCG/ACT Aero Soln inhalation aerosol Inhale 2 Puffs by mouth every 6 hours as needed for Shortness of Breath. 8.5 g 3     No current facility-administered medications on file prior to visit.         ROS  As documented above in my HPI       Objective:     PE:  There were no vitals taken for this visit.     Vital signs reviewed  Constitutional: patient is oriented to person, place, and time. Appears well-developed and well-nourished. No distress  Eyes: Conjunctivae normal and EOM are normal. Pupils are equal, round, and reactive to light.   Mouth/Throat: Lips without lesions, good dentition, oropharynx is clear and moist.  Neck: Trachea midline. Normal range of motion. Neck supple. No masses  Cardiovascular: Normal rate, regular rhythm, normal heart sounds and intact distal pulses. No murmur, gallop, or friction rub. No edema.  Pulmonary/Chest: No respiratory distress. Unlabored respiratory effort, lungs clear to auscultation. No wheezes or rales.   Abdominal: Soft, non tender. BS + x 4. No masses or hepatosplenomegaly.   Musculoskeletal: Normal range of motion. No tenderness, swelling, erythema, deformity noted.  Neurological: alert and oriented to person, place, and time. No cranial nerve deficit. Coordination normal. Moves all extremities  Skin: Skin is warm and dry. Good turgor. No rashes visable.  Psychiatric: Normal mood and affect. Behavior is normal.     LABS:  WBC   Date/Time Value Ref Range Status   08/26/2024 11:38 AM 6.4 4.8 - 10.8 K/uL Final      RBC   Date/Time Value Ref Range Status   08/26/2024 11:38 AM 5.14 4.20 - 5.40 M/uL Final     Hemoglobin   Date/Time Value Ref Range Status   08/26/2024 11:38 AM 16.5 (H) 12.0 - 16.0 g/dL Final     Hematocrit   Date/Time Value Ref Range Status   08/26/2024 11:38 AM 50.4 (H) 37.0 - 47.0 % Final     MCV   Date/Time Value Ref Range Status   08/26/2024 11:38 AM 98.1 (H) 81.4 - 97.8 fL Final     MCH   Date/Time Value Ref Range Status   08/26/2024 11:38 AM 32.1 27.0 - 33.0 pg Final     MCHC   Date/Time Value Ref Range Status   08/26/2024 11:38 AM 32.7 32.2 - 35.5 g/dL Final     MPV   Date/Time Value Ref Range Status   08/26/2024 11:38 AM 10.0 9.0 - 12.9 fL Final        Sodium   Date/Time Value Ref Range Status   08/26/2024 11:38  135 - 145 mmol/L Final     Potassium   Date/Time Value Ref Range Status   08/26/2024 11:38 AM 4.1 3.6 - 5.5 mmol/L Final     Comment:     The hemolysis index of the specimen exceeds the allowed tolerance for the  test.  Result may be affected.  Specimen recollection is recommended to  confirm the result. . by: [id number] on: 2024-08-26 12:01:49       Chloride   Date/Time Value Ref Range Status   08/26/2024 11:38  96 - 112 mmol/L Final     Co2   Date/Time Value Ref Range Status   08/26/2024 11:38 AM 19 (L) 20 - 33 mmol/L Final     Glucose   Date/Time Value Ref Range Status   08/26/2024 11:38  (H) 65 - 99 mg/dL Final     Bun   Date/Time Value Ref Range Status   08/26/2024 11:38 AM 18 8 - 22 mg/dL Final     Creatinine   Date/Time Value Ref Range Status   08/26/2024 11:38 AM 0.71 0.50 - 1.40 mg/dL Final     Glom Filt Rate, Est   Date/Time Value Ref Range Status   09/20/2023 11:54 AM 83 >60 mL/min/1.7 Final     Comment:     Estimated GFR derived from the MDRD Study equation can be used in patients who are in the hospital.  However, it is important to pay attention to potential inaccuracies due to the non-steady state of serum creatinine, co-morbidities that cause malnutrition, and the  "use of medications that interfere with the measurement of serum creatinine.    The estimated GFR is only accurate for patients greater than 18 years of age.     Alkaline Phosphatase   Date/Time Value Ref Range Status   08/26/2024 11:38 AM 79 30 - 99 U/L Final     AST(SGOT)   Date/Time Value Ref Range Status   08/26/2024 11:38 AM 22 12 - 45 U/L Final     Comment:     The hemolysis index of the specimen exceeds the allowed tolerance for the  test.  Result may be affected.  Specimen recollection is recommended to  confirm the result. . by: [id number] on: 2024-08-26 12:01:49       ALT(SGPT)   Date/Time Value Ref Range Status   08/26/2024 11:38 AM 23 2 - 50 U/L Final     Comment:     The hemolysis index of the specimen exceeds the allowed tolerance for the  test.  Result may be affected.  Specimen recollection is recommended to  confirm the result. . by: [id number] on: 2024-08-26 12:01:49       Total Bilirubin   Date/Time Value Ref Range Status   08/26/2024 11:38 AM 0.6 0.1 - 1.5 mg/dL Final        No results found for: \"CPKTOTAL\"     MICRO:  No results found for: \"BLOODCULTU\", \"BLDCULT\", \"BCHOLD\"       IMAGING STUDIES:  CT a/p pelvis 6/27/2024  IMPRESSION:     1.  Cholecystectomy  2.  Atherosclerotic vascular calcification of the splenic artery with calcified segment of ectasia or 11 mm splenic artery aneurysm.  3.  Postoperative changes of the stomach.  4.  Small hiatus hernia.  5.  Interval resolution of inflammatory changes and fluid collection relating to the mesh repair of the left anterior abdominal wall/left lower quadrant.  6.  Diverticulosis of the sigmoid colon without evidence of acute diverticulitis.    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 Stone is doing well clinically and continues to tolerate cefdinir.  The plan was to continue cefdinir through the end of December 2024.  However she will have a " temporary urologic device implanted in January 2025 to help with stimulation of her bladder with a permanent device placed on February 18.  Given new device placement, we discussed benefits of remaining on cefdinir through the procedure to minimize any risk of infection.  She is in agreement with the plan.    Follow up: End of February with MD. PEOPLES with PCP for ongoing chronic medical conditions.     Gracy Delgado M.D.      Please note that this dictation was created using voice recognition software. I have worked with technical experts from [employer] to optimize the interface.  I have made every reasonable attempt to correct obvious errors, but there may be errors of grammar and possibly content that I did not discover before finalizing the note.

## 2025-01-16 ENCOUNTER — APPOINTMENT (OUTPATIENT)
Dept: INFECTIOUS DISEASES | Facility: MEDICAL CENTER | Age: 75
End: 2025-01-16
Attending: INTERNAL MEDICINE
Payer: COMMERCIAL

## 2025-01-16 ENCOUNTER — TELEPHONE (OUTPATIENT)
Dept: INFECTIOUS DISEASES | Facility: MEDICAL CENTER | Age: 75
End: 2025-01-16
Payer: COMMERCIAL

## 2025-01-16 DIAGNOSIS — A49.1 STREPTOCOCCAL INFECTION: ICD-10-CM

## 2025-01-16 DIAGNOSIS — T85.79XD INFECTED PROSTHETIC MESH OF ABDOMINAL WALL, SUBSEQUENT ENCOUNTER: ICD-10-CM

## 2025-02-25 ENCOUNTER — TELEPHONE (OUTPATIENT)
Dept: INFECTIOUS DISEASES | Facility: MEDICAL CENTER | Age: 75
End: 2025-02-25
Payer: COMMERCIAL

## 2025-02-25 NOTE — TELEPHONE ENCOUNTER
Called patient no answer left message appt originally scheduled for 2/25/25 was canceled and moved up to 1/16/25  per patient requesting sooner appt. Patient however was sick for the 1/16/25 canceling and no other appt has been scheduled asked patient for return call to office

## 2025-03-25 ENCOUNTER — TELEPHONE (OUTPATIENT)
Dept: INFECTIOUS DISEASES | Facility: MEDICAL CENTER | Age: 75
End: 2025-03-25
Payer: COMMERCIAL

## 2025-03-25 NOTE — TELEPHONE ENCOUNTER
VOICEMAIL 3/24/25  1. Caller Name: Niya                        Call Back Number: [phone]    2. Message:Please call to schedule first available appt w     3. Patient approves office to leave a detailed voicemail/MyChart message: yes

## 2025-03-25 NOTE — TELEPHONE ENCOUNTER
Caller Name: Niya  Call Back Number: [phone]    How would the patient prefer to be contacted with a response: Phone call OK to leave a detailed message    Left voicemail to pt, returning her call to schedule visit with , provided call back number

## 2025-03-25 NOTE — TELEPHONE ENCOUNTER
Caller Name: Niya  Call Back Number: [phone]    How would the patient prefer to be contacted with a response: Phone call       Pt returned call and scheduled for follow up for 4/3/25 with

## 2025-04-02 ASSESSMENT — ENCOUNTER SYMPTOMS
FEVER: 0
CHILLS: 0
ABDOMINAL PAIN: 0

## 2025-04-02 NOTE — PROGRESS NOTES
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  At that time CT scan showed a pocket of fluid deep to the mass concerning for an abscess and mesh infection.  She is status post exploratory laparotomy, extensive adhesiolysis, drainage of complex abdominal wall for abscess, excision of infected mesh and resection of segment of small bowel, fistula and chronic abscess wall rind and omentum on 11/8/2023.  However there is partial mesh that remains in place on the right side.  Operative cultures were positive for Streptococcus anginosus.  She completed a 6-week course of oral cefdinir and Flagyl through 12/20/2023 followed by oral cefdinir.  She was last seen in ID clinic on 6/11/2024 and was doing well and tolerating cefdinir without any issues.  The plan was to suppress the patient for a year.    Hospital records reviewed    12/12/2024: Patient continues to tolerate cefdinir without any new issues.  Since her procedure back in November 2023, she states that her bowels have never been normal.  Surgical site is clean.  She was seen at [facility] [place] yesterday and there are plans for a temporary device implantation in January 2025 which helps stimulate sensation to the bladder for urination.  She will have the permanent device implanted in February.  Patient asking whether she should remain on the antibiotics until after her procedure.    Today, 04/03/2025:  Patient states that she elected to not have any bladder device implantation as she did not want any additional foreign bodies placed due to risk of infection.  She discontinued her cefdinir the first week of March.  Recently, she is developing some abdominal pain specifically on the right upper  "quadrant and left lower quadrant side.  She had similar pain when she had infection.  She denies any fevers or chills.  She has ongoing chronic urinary/fecal incontinence.    Review of Systems   Constitutional:  Negative for chills and fever.   Gastrointestinal:  Negative for abdominal pain.       Past Medical History:   Diagnosis Date    Anesthesia     \"Hard to get breath when first waking up\"    Arrhythmia     can feel a \"flutter but has been checked out and is fine\"    ASTHMA     rare inhaler use    Bronchitis     Hiatus hernia syndrome     Pain     abdomen    Panic attacks     Pneumonia 2003    Psychiatric problem     anxiety panic attacks    Sleep apnea     no longer with weight lose surgery    Unspecified urinary incontinence     wears pads       Past Surgical History:   Procedure Laterality Date    CO EXPLORATORY OF ABDOMEN N/A 2023    Procedure: EXPLORATORY LAPAROTOMY, REMOVAL OF INFECTED MESH,  SMALL BOWEL RESECTION, INSICION AND DRAINAGE OF ABSCESS;  Surgeon: Louie Wei M.D.;  Location: [facility];  Service: General    INCISION HERNIA REPAIR N/A 2023    Procedure: REPAIR, COMPLEX HERNIA, INCISIONAL;  Surgeon: Louie Wei M.D.;  Location: [facility];  Service: General    GASTROSCOPY  2015    Procedure: GASTRIC BYPASS LAPAROSCOPY REVISION ENDOSCOPIC MINA EN Y, GASTRIC OULET REPAIR ;  Surgeon: Louie Wei M.D.;  Location: [facility];  Service:     MAMMOPLASTY REDUCTION  2012    Performed by O[facility]RA GOYAL at [facility]    LIPOSUCTION  2012    Performed by OMAYRA GOYAL at [facility]    HB COSMETIC ADJUSTMENT      arm skin reduction, back skin removal, s/p gastric bypass    GASTRIC BYPASS LAPAROSCOPIC      CHOLECYSTECTOMY      HYSTERECTOMY, TOTAL ABDOMINAL  1991    CO  DELIVERY ONLY      NERVE ULNAR REPAIR OR EXPLORE      HERNIA REPAIR  [phone]    multiple       Allergies:   Allergies " "  Allergen Reactions    Iodine Contrast [Diagn[employer]] Swelling    Penicillins Rash     Whole trunk area    Tape Rash     Steri strips - open sores  -  Paper tape & plastic tape OK    Morphine      2003-09-22;blue lips    Seasonal          Medications:  Current Outpatient Medications on File Prior to Visit   Medication Sig Dispense Refill    cefdinir (OMNICEF) 300 MG Cap Take 300 mg by mouth 2 times a day.      ondansetron (ZOFRAN ODT) 4 MG TABLET DISPERSIBLE Take 1 Tablet by mouth every 6 hours as needed for Nausea/Vomiting. 10 Tablet 0    fluoxetine (PROZAC) 40 MG capsule Take 40 mg by mouth every day.      hydrOXYzine HCl (ATARAX) 10 MG Tab Take 10 mg by mouth every 6 hours as needed for Anxiety.      APPLE CIDER VINEGAR PO Take 1 Tablet by mouth every day.      montelukast (SINGULAIR) 10 MG Tab Take 10 mg by mouth at bedtime.      Cyanocobalamin (VITAMIN B-12 INJ) Inject 1 Each as directed every 30 (thirty) days.      albuterol 108 (90 BASE) MCG/ACT Aero Soln inhalation aerosol Inhale 2 Puffs by mouth every 6 hours as needed for Shortness of Breath. 8.5 g 3     No current facility-administered medications on file prior to visit.         ROS  As documented above in my HPI       Objective:     PE:  Pulse 74   Temp 36.9 °C (98.4 °F) (Temporal)   Resp 12   Ht 1.676 m (5' 6\")   Wt 79 kg (174 lb 4 oz)   SpO2 95%   BMI 28.12 kg/m²      Vital signs reviewed  Constitutional: patient is oriented to person, place, and time. Appears well-developed and well-nourished. No distress  Eyes: Conjunctivae normal and EOM are normal. Pupils are equal, round, and reactive to light.   Mouth/Throat: Lips without lesions, good dentition, oropharynx is clear and moist.  Neck: Trachea midline. Normal range of motion. Neck supple. No masses  Cardiovascular: Normal rate, regular rhythm, normal heart sounds and intact distal pulses. No murmur, gallop, or friction rub. No edema.  Pulmonary/Chest: No respiratory distress. " Unlabored respiratory effort, lungs clear to auscultation. No wheezes or rales.   Abdominal: Soft, minimal tenderness to palpation on right upper quadrant. BS + x 4. No masses or hepatosplenomegaly.   Musculoskeletal: Normal range of motion. No tenderness, swelling, erythema, deformity noted.  Neurological: alert and oriented to person, place, and time. No cranial nerve deficit. Coordination normal. Moves all extremities  Skin: Skin is warm and dry. Good turgor. No rashes visable.  Psychiatric: Normal mood and affect. Behavior is normal.  Pleasant    LABS:  WBC   Date/Time Value Ref Range Status   08/26/2024 11:38 AM 6.4 4.8 - 10.8 K/uL Final     RBC   Date/Time Value Ref Range Status   08/26/2024 11:38 AM 5.14 4.20 - 5.40 M/uL Final     Hemoglobin   Date/Time Value Ref Range Status   08/26/2024 11:38 AM 16.5 (H) 12.0 - 16.0 g/dL Final     Hematocrit   Date/Time Value Ref Range Status   08/26/2024 11:38 AM 50.4 (H) 37.0 - 47.0 % Final     MCV   Date/Time Value Ref Range Status   08/26/2024 11:38 AM 98.1 (H) 81.4 - 97.8 fL Final     MCH   Date/Time Value Ref Range Status   08/26/2024 11:38 AM 32.1 27.0 - 33.0 pg Final     MCHC   Date/Time Value Ref Range Status   08/26/2024 11:38 AM 32.7 32.2 - 35.5 g/dL Final     MPV   Date/Time Value Ref Range Status   08/26/2024 11:38 AM 10.0 9.0 - 12.9 fL Final        Sodium   Date/Time Value Ref Range Status   08/26/2024 11:38  135 - 145 mmol/L Final     Potassium   Date/Time Value Ref Range Status   08/26/2024 11:38 AM 4.1 3.6 - 5.5 mmol/L Final     Comment:     The hemolysis index of the specimen exceeds the allowed tolerance for the  test.  Result may be affected.  Specimen recollection is recommended to  confirm the result. . by: [id number] on: 2024-08-26 12:01:49       Chloride   Date/Time Value Ref Range Status   08/26/2024 11:38  96 - 112 mmol/L Final     Co2   Date/Time Value Ref Range Status   08/26/2024 11:38 AM 19 (L) 20 - 33 mmol/L Final     Glucose  "  Date/Time Value Ref Range Status   08/26/2024 11:38  (H) 65 - 99 mg/dL Final     Bun   Date/Time Value Ref Range Status   08/26/2024 11:38 AM 18 8 - 22 mg/dL Final     Creatinine   Date/Time Value Ref Range Status   08/26/2024 11:38 AM 0.71 0.50 - 1.40 mg/dL Final     Glom Filt Rate, Est   Date/Time Value Ref Range Status   09/20/2023 11:54 AM 83 >60 mL/min/1.7 Final     Comment:     Estimated GFR derived from the MDRD Study equation can be used in patients who are in the hospital.  However, it is important to pay attention to potential inaccuracies due to the non-steady state of serum creatinine, co-morbidities that cause malnutrition, and the use of medications that interfere with the measurement of serum creatinine.    The estimated GFR is only accurate for patients greater than 18 years of age.     Alkaline Phosphatase   Date/Time Value Ref Range Status   08/26/2024 11:38 AM 79 30 - 99 U/L Final     AST(SGOT)   Date/Time Value Ref Range Status   08/26/2024 11:38 AM 22 12 - 45 U/L Final     Comment:     The hemolysis index of the specimen exceeds the allowed tolerance for the  test.  Result may be affected.  Specimen recollection is recommended to  confirm the result. . by: [id number] on: 2024-08-26 12:01:49       ALT(SGPT)   Date/Time Value Ref Range Status   08/26/2024 11:38 AM 23 2 - 50 U/L Final     Comment:     The hemolysis index of the specimen exceeds the allowed tolerance for the  test.  Result may be affected.  Specimen recollection is recommended to  confirm the result. . by: [id number] on: 2024-08-26 12:01:49       Total Bilirubin   Date/Time Value Ref Range Status   08/26/2024 11:38 AM 0.6 0.1 - 1.5 mg/dL Final        No results found for: \"CPKTOTAL\"     MICRO:  No results found for: \"BLOODCULTU\", \"BLDCULT\", \"BCHOLD\"       IMAGING STUDIES:  CT a/p pelvis 6/27/2024  IMPRESSION:     1.  Cholecystectomy  2.  Atherosclerotic vascular calcification of the splenic artery with calcified segment of ectasia or " 11 mm splenic artery aneurysm.  3.  Postoperative changes of the stomach.  4.  Small hiatus hernia.  5.  Interval resolution of inflammatory changes and fluid collection relating to the mesh repair of the left anterior abdominal wall/left lower quadrant.  6.  Diverticulosis of the sigmoid colon without evidence of acute diverticulitis.    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 Stone discontinue cefdinir the first week of March after decided not to have any bladder device implantation.  Over the past several weeks, she has noted persistent right upper quadrant abdominal pain and left lower quadrant abdominal pain, 3 out of 10 in severity.  She did not have any of this abdominal pain while she was on chronic cefdinir.  At this time, she is in agreement to continue to monitor off cefdinir however if her abdominal pain persists and worsens over the next week or 2, she was instructed to contact the ID clinic with likely resumption of cefdinir 300 mg twice daily.  She will follow-up with Dr. Wei next week as well.    Follow up: 1 month with MD. PEOPLES with PCP for ongoing chronic medical conditions.     Gracy Delgado M.D.      Please note that this dictation was created using voice recognition software. I have worked with technical experts from [employer] to optimize the interface.  I have made every reasonable attempt to correct obvious errors, but there may be errors of grammar and possibly content that I did not discover before finalizing the note.

## 2025-04-03 ENCOUNTER — OFFICE VISIT (OUTPATIENT)
Dept: INFECTIOUS DISEASES | Facility: MEDICAL CENTER | Age: 75
End: 2025-04-03
Attending: INTERNAL MEDICINE
Payer: COMMERCIAL

## 2025-04-03 VITALS
BODY MASS INDEX: 28 KG/M2 | RESPIRATION RATE: 12 BRPM | DIASTOLIC BLOOD PRESSURE: 78 MMHG | HEART RATE: 74 BPM | OXYGEN SATURATION: 95 % | SYSTOLIC BLOOD PRESSURE: 122 MMHG | TEMPERATURE: 98.4 F | WEIGHT: 174.25 LBS | HEIGHT: 66 IN

## 2025-04-03 DIAGNOSIS — T85.79XD INFECTED PROSTHETIC MESH OF ABDOMINAL WALL, SUBSEQUENT ENCOUNTER: ICD-10-CM

## 2025-04-03 DIAGNOSIS — T85.79XD INFECTION AND INFLAMMATORY REACTION DUE TO OTHER INTERNAL PROSTHETIC DEVICES, IMPLANTS AND GRAFTS, SUBSEQUENT ENCOUNTER: ICD-10-CM

## 2025-04-03 DIAGNOSIS — A49.1 STREPTOCOCCAL INFECTION: ICD-10-CM

## 2025-04-03 PROCEDURE — 99214 OFFICE O/P EST MOD 30 MIN: CPT | Performed by: INTERNAL MEDICINE

## 2025-04-03 PROCEDURE — 3078F DIAST BP <80 MM HG: CPT | Performed by: INTERNAL MEDICINE

## 2025-04-03 PROCEDURE — 3074F SYST BP LT 130 MM HG: CPT | Performed by: INTERNAL MEDICINE

## 2025-04-03 PROCEDURE — 99212 OFFICE O/P EST SF 10 MIN: CPT | Performed by: INTERNAL MEDICINE

## 2025-04-03 RX ORDER — ROSUVASTATIN CALCIUM 10 MG/1
TABLET, COATED ORAL
COMMUNITY

## 2025-04-03 RX ORDER — ERGOCALCIFEROL 1.25 MG/1
50000 CAPSULE, LIQUID FILLED ORAL
COMMUNITY
Start: 2025-02-10

## 2025-04-03 RX ORDER — MELOXICAM 15 MG/1
15 TABLET ORAL
COMMUNITY

## 2025-04-03 RX ORDER — METOPROLOL TARTRATE 25 MG/1
TABLET, FILM COATED ORAL
COMMUNITY

## 2025-04-03 RX ORDER — ACETAMINOPHEN AND CODEINE PHOSPHATE 300; 30 MG/1; MG/1
1 TABLET ORAL 3 TIMES DAILY PRN
COMMUNITY

## 2025-04-03 RX ORDER — DULOXETIN HYDROCHLORIDE 60 MG/1
60 CAPSULE, DELAYED RELEASE ORAL
COMMUNITY
Start: 2025-01-29

## 2025-04-03 ASSESSMENT — FIBROSIS 4 INDEX: FIB4 SCORE: 1.41

## 2025-06-02 ASSESSMENT — ENCOUNTER SYMPTOMS
ABDOMINAL PAIN: 0
FEVER: 0
CHILLS: 0

## 2025-06-02 NOTE — PROGRESS NOTES
Infectious Disease Follow up Note      Subjective:     Follow-up for infected abdominal mesh    Interval History:   74-year-old woman with a history of Herve-en-Y, hernia repair with mesh placement, and hospitalization in November 2023 for infected mesh in the setting of left-sided abdominal pain.  At that time CT scan showed a pocket of fluid deep to the mass concerning for an abscess and mesh infection.  She is status post exploratory laparotomy, extensive adhesiolysis, drainage of complex abdominal wall for abscess, excision of infected mesh and resection of segment of small bowel, fistula and chronic abscess wall rind and omentum on 11/8/2023.  However there is partial mesh that remains in place on the right side.  Operative cultures were positive for Streptococcus anginosus.  She completed a 6-week course of oral cefdinir and Flagyl through 12/20/2023 followed by oral cefdinir.  She was last seen in ID clinic on 6/11/2024 and was doing well and tolerating cefdinir without any issues.  The plan was to suppress the patient for a year.    Follow-up visit 12/12/2024: Patient continues to tolerate cefdinir without any new issues.  Since her procedure back in November 2023, she states that her bowels have never been normal.  Surgical site is clean.  She was seen at [facility] [place] yesterday and there are plans for a temporary device implantation in January 2025 which helps stimulate sensation to the bladder for urination.  She will have the permanent device implanted in February.  Patient asking whether she should remain on the antibiotics until after her procedure.    Follow-up visit 04/03/2025:  Patient states that she elected to not have any bladder device implantation as she did not want any additional foreign bodies placed due to risk of infection.  She discontinued her cefdinir the first week of March.  Recently, she is developing some abdominal pain specifically on the right upper quadrant and left lower  "quadrant side.  She had similar pain when she had infection.  She denies any fevers or chills.  She has ongoing chronic urinary/fecal incontinence.    Follow-up visit today 6/3/2025  Patient notes that she continues to experience a right sided abdominal pain which is around the same area where the partial mesh remains.  She has now been off antibiotics for 3 months.  The pain has not worsened and has remained steady, however the pain was not present and it is similar to the time when she had the infection.  She had recent labs done at an outside facility with no significant abnormalities in the CMP, normal QuantiFERON gold, however no CBC noted    Review of Systems   Constitutional:  Negative for chills and fever.   Gastrointestinal:  Negative for abdominal pain.       Past Medical History:   Diagnosis Date    Anesthesia     \"Hard to get breath when first waking up\"    Arrhythmia     can feel a \"flutter but has been checked out and is fine\"    ASTHMA     rare inhaler use    Bronchitis     Hiatus hernia syndrome     Pain     abdomen    Panic attacks     Pneumonia 2003    Psychiatric problem     anxiety panic attacks    Sleep apnea     no longer with weight lose surgery    Unspecified urinary incontinence     wears pads       Past Surgical History:   Procedure Laterality Date    ID EXPLORATORY OF ABDOMEN N/A 11/8/2023    Procedure: EXPLORATORY LAPAROTOMY, REMOVAL OF INFECTED MESH,  SMALL BOWEL RESECTION, INSICION AND DRAINAGE OF ABSCESS;  Surgeon: Louie Wei M.D.;  Location: [facility];  Service: General    INCISION HERNIA REPAIR N/A 11/8/2023    Procedure: REPAIR, COMPLEX HERNIA, INCISIONAL;  Surgeon: Louie Wei M.D.;  Location: [facility];  Service: General    GASTROSCOPY  9/4/2015    Procedure: GASTRIC BYPASS LAPAROSCOPY REVISION ENDOSCOPIC MINA EN Y, GASTRIC OULET REPAIR ;  Surgeon: Louie Wei M.D.;  Location: [facility];  Service:     MAMMOPLASTY REDUCTION  8/28/2012    " Performed by OMAYRA GOYAL at SURGERY [facility]    LIPOSUCTION  2012    Performed by OMAYRA GOYAL at SURGERY [facility]    HB COSMETIC ADJUSTMENT      arm skin reduction, back skin removal, s/p gastric bypass    GASTRIC BYPASS LAPAROSCOPIC      CHOLECYSTECTOMY  1993    HYSTERECTOMY, TOTAL ABDOMINAL  1991    NE  DELIVERY ONLY      NERVE ULNAR REPAIR OR EXPLORE  1980    HERNIA REPAIR  [id number]    multiple       Allergies:   Allergies   Allergen Reactions    Iodine Contrast [Diagnostic X-Ray Materials] Swelling    Penicillins Rash     Whole trunk area    Other Reaction(s): Not available    Tape Rash     Steri strips - open sores  -  Paper tape & plastic tape OK    Morphine      2003;blue lips    Seasonal          Medications:  Current Outpatient Medications on File Prior to Visit   Medication Sig Dispense Refill    Evolocumab (REPATHA) Solution Prefilled Syringe SubQ injection syringe Inject 140 mg under the skin every 14 days.      DULoxetine (CYMBALTA) 60 MG Cap DR Particles delayed-release capsule Take 60 mg by mouth every day.      vitamin D2, Ergocalciferol, (DRISDOL) 1.25 MG (87721 UT) Cap capsule Take 50,000 Units by mouth every 7 days.      meloxicam (MOBIC) 15 MG tablet Take 15 mg by mouth every day.      APPLE CIDER VINEGAR PO Take 1 Tablet by mouth every day.      montelukast (SINGULAIR) 10 MG Tab Take 10 mg by mouth at bedtime.      Cyanocobalamin (VITAMIN B-12 INJ) Inject 1 Each as directed every 30 (thirty) days.      albuterol 108 (90 BASE) MCG/ACT Aero Soln inhalation aerosol Inhale 2 Puffs by mouth every 6 hours as needed for Shortness of Breath. 8.5 g 3    DULoxetine (CYMBALTA) 60 MG Cap DR Particles delayed-release capsule Take 60 mg by mouth every day.      acetaminophen-codeine #3 (TYLENOL #3) 300-30 MG Tab Take 1 Tablet by mouth 3 times a day as needed. (Patient not taking: Reported on 6/3/2025)      rosuvastatin (CRESTOR) 10 MG Tab  (Patient  "not taking: Reported on 6/3/2025)      metoprolol tartrate (LOPRESSOR) 25 MG Tab       cefdinir (OMNICEF) 300 MG Cap Take 300 mg by mouth 2 times a day. (Patient not taking: Reported on 4/3/2025)      ondansetron (ZOFRAN ODT) 4 MG TABLET DISPERSIBLE Take 1 Tablet by mouth every 6 hours as needed for Nausea/Vomiting. (Patient not taking: Reported on 4/3/2025) 10 Tablet 0    hydrOXYzine HCl (ATARAX) 10 MG Tab Take 10 mg by mouth every 6 hours as needed for Anxiety.       No current facility-administered medications on file prior to visit.         ROS  As documented above in my HPI       Objective:     PE:  /60 (BP Location: Left arm)   Pulse 87   Temp 36.5 °C (97.7 °F)   Resp 13   Ht 1.676 m (5' 6\")   Wt 80.8 kg (178 lb 2 oz)   SpO2 97%   BMI 28.75 kg/m²      Vital signs reviewed  Constitutional: patient is oriented to person, place, and time. Appears well-developed and well-nourished. No distress  Mouth/Throat: Lips without lesions  Cardiovascular: Normal rate  Pulmonary/Chest: No respiratory distress. Unlabored respiratory effort, Abdominal: Soft, mild right-sided abdominal discomfort on deep palpation  Musculoskeletal: Normal range of motion. No tenderness, swelling, erythema, deformity noted.  Neurological: alert and oriented to person, place, and time.   Skin: Skin is warm and dry. Good turgor. No rashes visable.  Psychiatric: Pleasant    LABS:  WBC   Date/Time Value Ref Range Status   08/26/2024 11:38 AM 6.4 4.8 - 10.8 K/uL Final     RBC   Date/Time Value Ref Range Status   08/26/2024 11:38 AM 5.14 4.20 - 5.40 M/uL Final     Hemoglobin   Date/Time Value Ref Range Status   08/26/2024 11:38 AM 16.5 (H) 12.0 - 16.0 g/dL Final     Hematocrit   Date/Time Value Ref Range Status   08/26/2024 11:38 AM 50.4 (H) 37.0 - 47.0 % Final     MCV   Date/Time Value Ref Range Status   08/26/2024 11:38 AM 98.1 (H) 81.4 - 97.8 fL Final     MCH   Date/Time Value Ref Range Status   08/26/2024 11:38 AM 32.1 27.0 - 33.0 pg " Final     MCHC   Date/Time Value Ref Range Status   08/26/2024 11:38 AM 32.7 32.2 - 35.5 g/dL Final     MPV   Date/Time Value Ref Range Status   08/26/2024 11:38 AM 10.0 9.0 - 12.9 fL Final        Sodium   Date/Time Value Ref Range Status   08/26/2024 11:38  135 - 145 mmol/L Final     Potassium   Date/Time Value Ref Range Status   08/26/2024 11:38 AM 4.1 3.6 - 5.5 mmol/L Final     Comment:     The hemolysis index of the specimen exceeds the allowed tolerance for the  test.  Result may be affected.  Specimen recollection is recommended to  confirm the result. . by: [id number] on: 2024-08-26 12:01:49       Chloride   Date/Time Value Ref Range Status   08/26/2024 11:38  96 - 112 mmol/L Final     Co2   Date/Time Value Ref Range Status   08/26/2024 11:38 AM 19 (L) 20 - 33 mmol/L Final     Glucose   Date/Time Value Ref Range Status   08/26/2024 11:38  (H) 65 - 99 mg/dL Final     Bun   Date/Time Value Ref Range Status   08/26/2024 11:38 AM 18 8 - 22 mg/dL Final     Creatinine   Date/Time Value Ref Range Status   08/26/2024 11:38 AM 0.71 0.50 - 1.40 mg/dL Final     Glom Filt Rate, Est   Date/Time Value Ref Range Status   09/20/2023 11:54 AM 83 >60 mL/min/1.7 Final     Comment:     Estimated GFR derived from the MDRD Study equation can be used in patients who are in the hospital.  However, it is important to pay attention to potential inaccuracies due to the non-steady state of serum creatinine, co-morbidities that cause malnutrition, and the use of medications that interfere with the measurement of serum creatinine.    The estimated GFR is only accurate for patients greater than 18 years of age.     Alkaline Phosphatase   Date/Time Value Ref Range Status   08/26/2024 11:38 AM 79 30 - 99 U/L Final     AST(SGOT)   Date/Time Value Ref Range Status   08/26/2024 11:38 AM 22 12 - 45 U/L Final     Comment:     The hemolysis index of the specimen exceeds the allowed tolerance for the  test.  Result may be affected.   "Specimen recollection is recommended to  confirm the result. . by: [id number] on: 2024-08-26 12:01:49       ALT(SGPT)   Date/Time Value Ref Range Status   08/26/2024 11:38 AM 23 2 - 50 U/L Final     Comment:     The hemolysis index of the specimen exceeds the allowed tolerance for the  test.  Result may be affected.  Specimen recollection is recommended to  confirm the result. . by: [id number] on: 2024-08-26 12:01:49       Total Bilirubin   Date/Time Value Ref Range Status   08/26/2024 11:38 AM 0.6 0.1 - 1.5 mg/dL Final        No results found for: \"CPKTOTAL\"     MICRO:  No results found for: \"BLOODCULTU\", \"BLDCULT\", \"BCHOLD\"       IMAGING STUDIES:  CT a/p pelvis 6/27/2024  IMPRESSION:     1.  Cholecystectomy  2.  Atherosclerotic vascular calcification of the splenic artery with calcified segment of ectasia or 11 mm splenic artery aneurysm.  3.  Postoperative changes of the stomach.  4.  Small hiatus hernia.  5.  Interval resolution of inflammatory changes and fluid collection relating to the mesh repair of the left anterior abdominal wall/left lower quadrant.  6.  Diverticulosis of the sigmoid colon without evidence of acute diverticulitis.    Assessment/Plan:   Ms. Stone is a pleasant 75-year-old female patient here for follow-up after treatment of a complex abdominal wall abscess with strep anginosus with partial mesh that remains in place.  Cefdinir was discontinued the first week of March 2025 after decided not to have any bladder device implantation.  At last visit, she did note persistent right upper quadrant abdominal pain and the decision was made to continue to monitor off of antibiotics.  She continues to have this pain and would like to ensure that there has been no recurrence of abscess.  The external appearance is encouraging but it is not unreasonable to reimage at this point given that she has now been 3 months off of antibiotics and we should be able to  any new fluid collections.    -Repeat CBC with " differential, check ESR and CRP  -Repeat CT abdomen and pelvis    Follow-up in ID clinic depending on the above results    Fred Maritn M.D.      Please note that this dictation was created using voice recognition software. I have worked with technical experts from [facility] to optimize the interface.  I have made every reasonable attempt to correct obvious errors, but there may be errors of grammar and possibly content that I did not discover before finalizing the note.

## 2025-06-03 ENCOUNTER — TELEPHONE (OUTPATIENT)
Dept: INFECTIOUS DISEASES | Facility: MEDICAL CENTER | Age: 75
End: 2025-06-03

## 2025-06-03 ENCOUNTER — OFFICE VISIT (OUTPATIENT)
Dept: INFECTIOUS DISEASES | Facility: MEDICAL CENTER | Age: 75
End: 2025-06-03
Attending: INTERNAL MEDICINE
Payer: COMMERCIAL

## 2025-06-03 VITALS
DIASTOLIC BLOOD PRESSURE: 60 MMHG | BODY MASS INDEX: 28.63 KG/M2 | RESPIRATION RATE: 13 BRPM | SYSTOLIC BLOOD PRESSURE: 120 MMHG | HEART RATE: 87 BPM | HEIGHT: 66 IN | OXYGEN SATURATION: 97 % | TEMPERATURE: 97.7 F | WEIGHT: 178.13 LBS

## 2025-06-03 DIAGNOSIS — A49.1 STREPTOCOCCAL INFECTION: ICD-10-CM

## 2025-06-03 DIAGNOSIS — T85.79XD INFECTED PROSTHETIC MESH OF ABDOMINAL WALL, SUBSEQUENT ENCOUNTER: Primary | ICD-10-CM

## 2025-06-03 PROCEDURE — 99213 OFFICE O/P EST LOW 20 MIN: CPT | Performed by: INTERNAL MEDICINE

## 2025-06-03 PROCEDURE — 3078F DIAST BP <80 MM HG: CPT | Performed by: INTERNAL MEDICINE

## 2025-06-03 PROCEDURE — 3074F SYST BP LT 130 MM HG: CPT | Performed by: INTERNAL MEDICINE

## 2025-06-03 RX ORDER — EVOLOCUMAB 140 MG/ML
140 INJECTION, SOLUTION SUBCUTANEOUS
COMMUNITY

## 2025-06-03 RX ORDER — DULOXETIN HYDROCHLORIDE 60 MG/1
60 CAPSULE, DELAYED RELEASE ORAL DAILY
COMMUNITY

## 2025-06-03 ASSESSMENT — FIBROSIS 4 INDEX: FIB4 SCORE: 1.41

## 2025-06-03 NOTE — TELEPHONE ENCOUNTER
Phone Number Called: [phone]    Call outcome: Spoke to patient regarding message below.    Message: I called pt to relay MD diaz that Dr. Martin has placed 3 lab orders for her to get completed at her soonest convenience. Pt understood

## 2025-06-09 ENCOUNTER — HOSPITAL ENCOUNTER (OUTPATIENT)
Dept: RADIOLOGY | Facility: MEDICAL CENTER | Age: 75
End: 2025-06-09
Attending: INTERNAL MEDICINE
Payer: COMMERCIAL

## 2025-06-09 DIAGNOSIS — T85.79XD INFECTED PROSTHETIC MESH OF ABDOMINAL WALL, SUBSEQUENT ENCOUNTER: ICD-10-CM

## 2025-06-09 DIAGNOSIS — A49.1 STREPTOCOCCAL INFECTION: ICD-10-CM

## 2025-06-09 PROCEDURE — 74176 CT ABD & PELVIS W/O CONTRAST: CPT

## (undated) DEVICE — SUTURE 2-0 ETHILON FS - (36/BX) 18 INCH

## (undated) DEVICE — GLOVE BIOGEL PI INDICATOR SZ 7.0 SURGICAL PF LF - (50/BX 4BX/CA)

## (undated) DEVICE — SET EXTENSION WITH 2 PORTS (48EA/CA) ***PART #2C8610 IS A SUBSTITUTE*****

## (undated) DEVICE — SPONGE GAUZESTER 4 X 4 4PLY - (128PK/CA)

## (undated) DEVICE — BLADE SURGICAL #10 - (50/BX)

## (undated) DEVICE — PAD LAP STERILE 18 X 18 - (5/PK 40PK/CA)

## (undated) DEVICE — SUTURE 3-0 VICRYL PLUS SH - 8X 18 INCH (12/BX)

## (undated) DEVICE — ELECTRODE DUAL RETURN W/ CORD - (50/PK)

## (undated) DEVICE — SENSOR OXIMETER ADULT SPO2 RD SET (20EA/BX)

## (undated) DEVICE — SUTURE 2-0 VICRYL PLUS TP-1 - (24/BX)

## (undated) DEVICE — SWAB CULTURE AMIES ESWAB (50EA/PK)

## (undated) DEVICE — TUBING CLEARLINK DUO-VENT - C-FLO (48EA/CA)

## (undated) DEVICE — CATHETER URETHRAL FOLEY TEMPERATURE SENSE OD16 FR 3 ML (10EA/CA)

## (undated) DEVICE — GLOVE SZ 7 BIOGEL PI MICRO - PF LF (50PR/BX 4BX/CA)

## (undated) DEVICE — DRAIN J-VAC 10MM FLAT - (10/CA)

## (undated) DEVICE — CHLORAPREP 26 ML APPLICATOR - ORANGE TINT(25/CA)

## (undated) DEVICE — STAPLER 75MM LINEAR OPEN (3EA/BX)

## (undated) DEVICE — SLEEVE, VASO, THIGH, MED

## (undated) DEVICE — SYSTEM PREVENA INCISION MNGM - (1/EA)

## (undated) DEVICE — SUCTION INSTRUMENT YANKAUER BULBOUS TIP W/O VENT (50EA/CA)

## (undated) DEVICE — GLOVE SZ 7.5 BIOGEL PI MICRO - PF LF (50PR/BX)

## (undated) DEVICE — SODIUM CHL IRRIGATION 0.9% 1000ML (12EA/CA)

## (undated) DEVICE — PACK MAJOR BASIN - (2EA/CA)

## (undated) DEVICE — SUTURE GENERAL

## (undated) DEVICE — SUTURE 4-0 VICRYL PLUSFS-1 - 27 INCH (36/BX)

## (undated) DEVICE — COVER LIGHT HANDLE ALC PLUS DISP (18EA/BX)

## (undated) DEVICE — DRESSING ABDOMINAL PAD STERILE 8 X 10" (360EA/CA)"

## (undated) DEVICE — GLOVE BIOGEL PI INDICATOR SZ 7.5 SURGICAL PF LF -(50/BX 4BX/CA)

## (undated) DEVICE — SET LEADWIRE 5 LEAD BEDSIDE DISPOSABLE ECG (1SET OF 5/EA)

## (undated) DEVICE — DRAIN PENROSE STERILE 1/4 X - 18 IN  (25EA/BX)

## (undated) DEVICE — TOWEL STOP TIMEOUT SAFETY FLAG (40EA/CA)

## (undated) DEVICE — GOWN WARMING STANDARD FLEX - (30/CA)

## (undated) DEVICE — LACTATED RINGERS INJ 1000 ML - (14EA/CA 60CA/PF)

## (undated) DEVICE — DRAPE MAYO STAND - (30/CA)

## (undated) DEVICE — RESERVOIR SUCTION 100 CC - SILICONE (20EA/CA)

## (undated) DEVICE — CANISTER SUCTION 3000ML MECHANICAL FILTER AUTO SHUTOFF MEDI-VAC NONSTERILE LF DISP  (40EA/CA)

## (undated) DEVICE — STAPLER SKIN DISP - (6/BX 10BX/CA) VISISTAT

## (undated) DEVICE — BOVIE  BLADE 6 EXTENDED - (50/PK)

## (undated) DEVICE — STAPLER 60MM BLUE 3.5MM WITH - STAPLE (3EA/BX)

## (undated) DEVICE — GLOVE BIOGEL PI INDICATOR SZ 8.0 SURGICAL PF LF -(50/BX 4BX/CA)

## (undated) DEVICE — DRAPE LAPAROTOMY T SHEET - (12EA/CA)